# Patient Record
Sex: FEMALE | Race: WHITE | NOT HISPANIC OR LATINO | URBAN - METROPOLITAN AREA
[De-identification: names, ages, dates, MRNs, and addresses within clinical notes are randomized per-mention and may not be internally consistent; named-entity substitution may affect disease eponyms.]

---

## 2019-10-07 ENCOUNTER — EMERGENCY (EMERGENCY)
Facility: HOSPITAL | Age: 85
LOS: 1 days | Discharge: HOME | End: 2019-10-07
Attending: EMERGENCY MEDICINE
Payer: MEDICARE

## 2019-10-07 VITALS
HEART RATE: 75 BPM | DIASTOLIC BLOOD PRESSURE: 70 MMHG | SYSTOLIC BLOOD PRESSURE: 126 MMHG | TEMPERATURE: 98 F | HEIGHT: 59.5 IN | OXYGEN SATURATION: 96 % | WEIGHT: 145.95 LBS | RESPIRATION RATE: 18 BRPM

## 2019-10-07 LAB
ALBUMIN SERPL ELPH-MCNC: 3.9 G/DL — SIGNIFICANT CHANGE UP (ref 3.3–5)
ALP SERPL-CCNC: 53 U/L — SIGNIFICANT CHANGE UP (ref 40–120)
ALT FLD-CCNC: 18 U/L — SIGNIFICANT CHANGE UP (ref 10–45)
ANION GAP SERPL CALC-SCNC: 8 MMOL/L — SIGNIFICANT CHANGE UP (ref 5–17)
AST SERPL-CCNC: 18 U/L — SIGNIFICANT CHANGE UP (ref 10–40)
BILIRUB SERPL-MCNC: 0.5 MG/DL — SIGNIFICANT CHANGE UP (ref 0.2–1.2)
BUN SERPL-MCNC: 28 MG/DL — HIGH (ref 7–23)
CALCIUM SERPL-MCNC: 9.5 MG/DL — SIGNIFICANT CHANGE UP (ref 8.4–10.5)
CHLORIDE SERPL-SCNC: 103 MMOL/L — SIGNIFICANT CHANGE UP (ref 96–108)
CO2 SERPL-SCNC: 26 MMOL/L — SIGNIFICANT CHANGE UP (ref 22–31)
CREAT SERPL-MCNC: 1.18 MG/DL — SIGNIFICANT CHANGE UP (ref 0.5–1.3)
GLUCOSE SERPL-MCNC: 85 MG/DL — SIGNIFICANT CHANGE UP (ref 70–99)
HCT VFR BLD CALC: 41.9 % — SIGNIFICANT CHANGE UP (ref 34.5–45)
HGB BLD-MCNC: 13.8 G/DL — SIGNIFICANT CHANGE UP (ref 11.5–15.5)
MCHC RBC-ENTMCNC: 31.5 PG — SIGNIFICANT CHANGE UP (ref 27–34)
MCHC RBC-ENTMCNC: 32.9 GM/DL — SIGNIFICANT CHANGE UP (ref 32–36)
MCV RBC AUTO: 95.7 FL — SIGNIFICANT CHANGE UP (ref 80–100)
NRBC # BLD: 0 /100 WBCS — SIGNIFICANT CHANGE UP (ref 0–0)
PLATELET # BLD AUTO: 177 K/UL — SIGNIFICANT CHANGE UP (ref 150–400)
POTASSIUM SERPL-MCNC: 4.7 MMOL/L — SIGNIFICANT CHANGE UP (ref 3.5–5.3)
POTASSIUM SERPL-SCNC: 4.7 MMOL/L — SIGNIFICANT CHANGE UP (ref 3.5–5.3)
PROT SERPL-MCNC: 6.4 G/DL — SIGNIFICANT CHANGE UP (ref 6–8.3)
RBC # BLD: 4.38 M/UL — SIGNIFICANT CHANGE UP (ref 3.8–5.2)
RBC # FLD: 13.4 % — SIGNIFICANT CHANGE UP (ref 10.3–14.5)
SODIUM SERPL-SCNC: 137 MMOL/L — SIGNIFICANT CHANGE UP (ref 135–145)
TROPONIN T, HIGH SENSITIVITY RESULT: 6 NG/L — SIGNIFICANT CHANGE UP (ref 0–51)
TROPONIN T, HIGH SENSITIVITY RESULT: 8 NG/L — SIGNIFICANT CHANGE UP (ref 0–51)
WBC # BLD: 10.97 K/UL — HIGH (ref 3.8–10.5)
WBC # FLD AUTO: 10.97 K/UL — HIGH (ref 3.8–10.5)

## 2019-10-07 PROCEDURE — 71046 X-RAY EXAM CHEST 2 VIEWS: CPT

## 2019-10-07 PROCEDURE — 99284 EMERGENCY DEPT VISIT MOD MDM: CPT | Mod: 25

## 2019-10-07 PROCEDURE — 99285 EMERGENCY DEPT VISIT HI MDM: CPT | Mod: GC

## 2019-10-07 PROCEDURE — 93010 ELECTROCARDIOGRAM REPORT: CPT | Mod: GC

## 2019-10-07 PROCEDURE — 85027 COMPLETE CBC AUTOMATED: CPT

## 2019-10-07 PROCEDURE — 93005 ELECTROCARDIOGRAM TRACING: CPT

## 2019-10-07 PROCEDURE — 80053 COMPREHEN METABOLIC PANEL: CPT

## 2019-10-07 PROCEDURE — 84484 ASSAY OF TROPONIN QUANT: CPT

## 2019-10-07 PROCEDURE — 71046 X-RAY EXAM CHEST 2 VIEWS: CPT | Mod: 26

## 2019-10-07 NOTE — ED PROVIDER NOTE - CLINICAL SUMMARY MEDICAL DECISION MAKING FREE TEXT BOX
Gretchen PGY3: 87F hx of HLD presents with a cc of sudden onset non exertional chest pain a/w radiation to b/l shoulders of back, resolved while in ED, no LE swelling, today had smile mild SOB however also resolved.  exam vss non toxic appearing will eval for acs check labs cxr ekg trop, reassess

## 2019-10-07 NOTE — ED PROVIDER NOTE - PATIENT PORTAL LINK FT
You can access the FollowMyHealth Patient Portal offered by [facility] by registering at the following website: [email or web address]. By joining [facility]’s FollowMyHealth portal, you will also be able to view your health information using other applications (apps) compatible with our system.

## 2019-10-07 NOTE — CONSULT NOTE ADULT - ASSESSMENT
87F hx of HLD presenting with chest pain.     1. Chest pain 87F hx of HLD presenting with atypical chest pain.     1. Atypical Chest pain  likely GI related, 2/2 to GERD   cv stable, HST neg x 2, EKG with no evidence of acute ischemia   CP now resolved, no sob, rangel  cxr clear, no evidence of CHF on exam  continue statin for HLD , benicar for HTN  pt. does not take asa as pt. has history of ulcerative colitis   if admitted can check echo to eval LV function, valvular disease - can be done as outpt w/ Dr. Chris   further w/u , dispo per ED     if discharged, outpt. f/u with Dr. Chris within 1 week.

## 2019-10-07 NOTE — ED PROVIDER NOTE - PLAN OF CARE
Thank you for visiting our Emergency Department, it has been a pleasure taking part in your healthcare.    Your discharge diagnosis is: chest pain  Please take all discharge medications as indicated below:  Take Motrin/Tylenol for pain as needed, please follow instructions on manufacturers label. If you have any questions please consult a pharmacist or your PMD.  Please follow up with your PMD within x48 hours.  Please follow up with Cardiology within x48 hours.  A copy of resulted labs, imaging, and findings have been provided to you.   You have had a detailed discussion with your provider regarding your diagnosis, care management and discharge planning including, but not limited to: return precautions, follow up visits with existing or new providers, new prescriptions and/or medication changes, wound and/or splint/cast care or other care   aspects specific to your diagnosis and treatment. You have been given the opportunity to have your questions answered. At this time you have been deemed stable and fit for discharge.  Return precautions to the Emergency Department include but are not limited to: unrelenting nausea, vomiting, fever, chills, chest pain, shortness of breath, dizziness, chest or abdominal pain, worsening back pain, syncope, blood in urine or stool, headache that doesn't resolve, numbness or tingling, loss of sensation, loss of motor function, or any other concerning symptoms.

## 2019-10-07 NOTE — CONSULT NOTE ADULT - SUBJECTIVE AND OBJECTIVE BOX
CARDIOLOGY CONSULT - Dr. Tse     CHIEF COMPLAINT: chest pain     HPI: 87F hx of HLD presents with a cc of sudden onset non exertional chest pain a/w radiation to b/l shoulders of back, resolved while in ED, no LE swelling, today had smile mild SOB however also resolved. No other complaints. Patient denies any current chest pain, dyspnea, palpitations, cough, syncope, edema, exertional symptoms, nausea, abdominal pain, fever, chills,  or rash.  Denies any history of CAD, MI, valve disease, cardiomyopathy, or congenital heart disease.       PAST MEDICAL & SURGICAL HISTORY:  Skin lesion of right arm  Anxiety  Ulcerative colitis  High cholesterol  Hypertension  No significant past surgical history          PREVIOUS DIAGNOSTIC TESTING:    [ ] Echocardiogram: < from: Transthoracic Echocardiogram (02.05.16 @ 16:11) >  Conclusions:  1. Mitral annular calcificationand calcified mitral  leaflets with normal diastolic opening. Minimal mitral  regurgitation.  2. Normal left ventricular systolic function. No segmental  wall motion abnormalities.  3. Mild diastolic dysfunction (Stage I).  4. Right ventricular enlargement with normal right  ventricular systolic function.  *** No previous Echo exam.    < end of copied text >    [ ]  Catheterization:   [ ] Stress Test:  	    MEDICATIONS:  MEDICATIONS  (STANDING):      FAMILY HISTORY:  No pertinent family history in first degree relatives      SOCIAL HISTORY:    [ ] Non-smoker  [ ] Smoker  [ ] Alcohol    Allergies    penicillin (Unknown)    Intolerances    	REVIEW OF SYSTEMS:  CONSTITUTIONAL: No fever, weight loss, or fatigue  EYES: No eye pain, visual disturbances, or discharge  ENMT:  No difficulty hearing, tinnitus, vertigo; No sinus or throat pain  NECK: No pain or stiffness  RESPIRATORY: No cough, wheezing, chills or hemoptysis; No Shortness of Breath  CARDIOVASCULAR: +chest pain, palpitations, passing out, dizziness, or leg swelling  GASTROINTESTINAL: No abdominal or epigastric pain. No nausea, vomiting, or hematemesis; No diarrhea or constipation. No melena or hematochezia.  GENITOURINARY: No dysuria, frequency, hematuria, or incontinence  NEUROLOGICAL: No headaches, memory loss, loss of strength, numbness, or tremors  SKIN: No itching, burning, rashes, or lesions   	    [x ] All others negative	  [ ] Unable to obtain    PHYSICAL EXAM:  T(C): 36.8 (10-07-19 @ 13:38), Max: 36.8 (10-07-19 @ 13:38)  HR: 75 (10-07-19 @ 13:38) (75 - 75)  BP: 126/70 (10-07-19 @ 13:38) (126/70 - 126/70)  RR: 18 (10-07-19 @ 13:38) (18 - 18)  SpO2: 96% (10-07-19 @ 13:38) (96% - 96%)  Wt(kg): --  I&O's Summary      Appearance: Normal	  Psychiatry: A & O x 3, Mood & affect appropriate  HEENT:   Normal oral mucosa, PERRL, EOMI	  Lymphatic: No lymphadenopathy  Cardiovascular: Normal S1 S2,RRR, No JVD, No murmurs  Respiratory: Lungs clear to auscultation	  Gastrointestinal:  Soft, Non-tender, + BS	  Skin: No rashes, No ecchymoses, No cyanosis	  Neurologic: Non-focal  Extremities: Normal range of motion, No clubbing, cyanosis or edema  Vascular: Peripheral pulses palpable 2+ bilaterally    TELEMETRY: 	    ECG:  	NSR 70, no acute ischemic changes   RADIOLOGY: < from: Xray Chest 2 Views PA/Lat (10.07.19 @ 14:41) >  Impression:    The heart is normal in size. The lungs are clear. Calcified aortic knob.   No pleural effusion. No pneumothorax. No acute bony pathology could be   identified on the current study.    < end of copied text >    OTHER: 	  	  LABS:	 	    CARDIAC MARKERS:      ce neg x 1                            13.8   10.97 )-----------( 177      ( 07 Oct 2019 14:21 )             41.9     10-07    137  |  103  |  28<H>  ----------------------------<  85  4.7   |  26  |  1.18    Ca    9.5      07 Oct 2019 14:21    TPro  6.4  /  Alb  3.9  /  TBili  0.5  /  DBili  x   /  AST  18  /  ALT  18  /  AlkPhos  53  10-07      proBNP:   Lipid Profile:   HgA1c:   TSH: CARDIOLOGY CONSULT - Dr. Tse     CHIEF COMPLAINT: chest pain     HPI: 87F hx of HLD presents with a cc of sudden onset non exertional chest pain a/w radiation to b/l shoulders of back. As per patient the pain staarted before breakfast, she thought it was her usual indigestion pain but when it didnt go away right away she got nervous, discomfort was resolved while in ED. No other complaints. Patient denies any current chest pain, dyspnea, palpitations, cough, syncope, edema, exertional symptoms, nausea, abdominal pain, fever, chills,  or rash.  Denies any history of CAD, MI, valve disease, cardiomyopathy, or congenital heart disease.      PAST MEDICAL & SURGICAL HISTORY:  Skin lesion of right arm  Anxiety  Ulcerative colitis  High cholesterol  Hypertension  No significant past surgical history          PREVIOUS DIAGNOSTIC TESTING:    [ ] Echocardiogram: < from: Transthoracic Echocardiogram (02.05.16 @ 16:11) >  Conclusions:  1. Mitral annular calcificationand calcified mitral  leaflets with normal diastolic opening. Minimal mitral  regurgitation.  2. Normal left ventricular systolic function. No segmental  wall motion abnormalities.  3. Mild diastolic dysfunction (Stage I).  4. Right ventricular enlargement with normal right  ventricular systolic function.  *** No previous Echo exam.    < end of copied text >    [ ]  Catheterization:   [ ] Stress Test:  	    MEDICATIONS:  MEDICATIONS  (STANDING):      FAMILY HISTORY:  No pertinent family history in first degree relatives      SOCIAL HISTORY:    [ ] Non-smoker  [ ] Smoker  [ ] Alcohol    Allergies    penicillin (Unknown)    Intolerances    	REVIEW OF SYSTEMS:  CONSTITUTIONAL: No fever, weight loss, or fatigue  EYES: No eye pain, visual disturbances, or discharge  ENMT:  No difficulty hearing, tinnitus, vertigo; No sinus or throat pain  NECK: No pain or stiffness  RESPIRATORY: No cough, wheezing, chills or hemoptysis; No Shortness of Breath  CARDIOVASCULAR: +chest pain, palpitations, passing out, dizziness, or leg swelling  GASTROINTESTINAL: No abdominal or epigastric pain. No nausea, vomiting, or hematemesis; No diarrhea or constipation. No melena or hematochezia.  GENITOURINARY: No dysuria, frequency, hematuria, or incontinence  NEUROLOGICAL: No headaches, memory loss, loss of strength, numbness, or tremors  SKIN: No itching, burning, rashes, or lesions   	    [x ] All others negative	  [ ] Unable to obtain    PHYSICAL EXAM:  T(C): 36.8 (10-07-19 @ 13:38), Max: 36.8 (10-07-19 @ 13:38)  HR: 75 (10-07-19 @ 13:38) (75 - 75)  BP: 126/70 (10-07-19 @ 13:38) (126/70 - 126/70)  RR: 18 (10-07-19 @ 13:38) (18 - 18)  SpO2: 96% (10-07-19 @ 13:38) (96% - 96%)  Wt(kg): --  I&O's Summary      Appearance: Normal	  Psychiatry: A & O x 3, Mood & affect appropriate  HEENT:   Normal oral mucosa, PERRL, EOMI	  Lymphatic: No lymphadenopathy  Cardiovascular: Normal S1 S2,RRR, No JVD, No murmurs  Respiratory: Lungs clear to auscultation	  Gastrointestinal:  Soft, Non-tender, + BS	  Skin: No rashes, No ecchymoses, No cyanosis	  Neurologic: Non-focal  Extremities: Normal range of motion, No clubbing, cyanosis or edema  Vascular: Peripheral pulses palpable 2+ bilaterally    TELEMETRY: 	    ECG:  	NSR 70, no acute ischemic changes   RADIOLOGY: < from: Xray Chest 2 Views PA/Lat (10.07.19 @ 14:41) >  Impression:    The heart is normal in size. The lungs are clear. Calcified aortic knob.   No pleural effusion. No pneumothorax. No acute bony pathology could be   identified on the current study.    < end of copied text >    OTHER: 	  	  LABS:	 	    CARDIAC MARKERS:      ce neg x 1                            13.8   10.97 )-----------( 177      ( 07 Oct 2019 14:21 )             41.9     10-07    137  |  103  |  28<H>  ----------------------------<  85  4.7   |  26  |  1.18    Ca    9.5      07 Oct 2019 14:21    TPro  6.4  /  Alb  3.9  /  TBili  0.5  /  DBili  x   /  AST  18  /  ALT  18  /  AlkPhos  53  10-07      proBNP:   Lipid Profile:   HgA1c:   TSH:

## 2019-10-07 NOTE — CONSULT NOTE ADULT - ATTENDING COMMENTS
Agree with above NP note.  cv stable   atypical chest pain  no acs, hs trop neg x 2  stable for d/c and f/u with dr cruz this week

## 2019-10-07 NOTE — ED PROVIDER NOTE - ATTENDING CONTRIBUTION TO CARE
Patient presenting complaining of chest pains.  Reporting felt "gas" like, central.  Began suddenly earlier this morning, self resolved prior to coming to Emergency Department.  No associated dyspnea, nausea, diaphoresis.  No known history of CAD.  Prior echos/stress testing with PMD/cardiologist (JAXON Chris) have been negative.  No worsening or changes with exertion. Now feels back to baseline.    A 14 point review of systems is negative except as in HPI or otherwise documented.    Exam:  General: Patient well appearing, vital signs within normal limits  HEENT: airway patent with moist mucous membranes  Cardiac: RRR S1/S2 with strong peripheral pulses  Respiratory: lungs clear without respiratory distress  GI: abdomen soft, non tender, non distended  Neuro: no gross neurologic deficits  Skin: warm, well perfused  Psych: normal mood and affect    Patient presenting with atypical resolved, non exertional chest pains.  No known history of CAD.  EKG unremarkable.  Labs, CXR and serial troponins to rule out ACS were negative.  Discussed case with Dr Chris who will be able to see patient in his office tomorrow for follow up.  Patient requesting discharge.

## 2019-10-07 NOTE — ED PROVIDER NOTE - OBJECTIVE STATEMENT
87F hx of HLD presents with a cc of sudden onset non exertional chest pain a/w radiation to b/l shoulders of back, resolved while in ED, no LE swelling, today had smile mild SOB however also resolved. No other complaints. Prior to today, previous cp, sob. Denies n/v/f/c/cp/sob. Denies headache, syncope, lightheadedness, dizziness. Denies chest palpitations, abdominal pain. Denies dysuria, hematuria, hematochezia, BRBPR, tarry stools, diarrhea, constipation.

## 2019-10-07 NOTE — ED ADULT NURSE NOTE - OBJECTIVE STATEMENT
Patient  is  alert  and  oriented  x3.  Color is  good  and  skin  warm  to touch.  She  is  c/o  chest  pain  with  radiation  to  back.  No  SOB  or  diaphoresis   noted.

## 2019-10-07 NOTE — ED PROVIDER NOTE - PROGRESS NOTE DETAILS
Gretchen PGY3: Pt assessed at beside. Pt resting comfortably, pain controlled, pt questions answered. Vital signs stable. Feels well, asking to be dc'd, no cp while in ED< pt to follow up w Dr. Doss tomorrow, understands plan will seek folllow up. Will d/c with PMD f/u, strict return precautions given with read back per pt/family/caregiver.

## 2019-10-07 NOTE — ED PROVIDER NOTE - CARE PLAN
Principal Discharge DX:	Chest pain  Assessment and plan of treatment:	Thank you for visiting our Emergency Department, it has been a pleasure taking part in your healthcare.    Your discharge diagnosis is: chest pain  Please take all discharge medications as indicated below:  Take Motrin/Tylenol for pain as needed, please follow instructions on manufacturers label. If you have any questions please consult a pharmacist or your PMD.  Please follow up with your PMD within x48 hours.  Please follow up with Cardiology within x48 hours.  A copy of resulted labs, imaging, and findings have been provided to you.   You have had a detailed discussion with your provider regarding your diagnosis, care management and discharge planning including, but not limited to: return precautions, follow up visits with existing or new providers, new prescriptions and/or medication changes, wound and/or splint/cast care or other care   aspects specific to your diagnosis and treatment. You have been given the opportunity to have your questions answered. At this time you have been deemed stable and fit for discharge.  Return precautions to the Emergency Department include but are not limited to: unrelenting nausea, vomiting, fever, chills, chest pain, shortness of breath, dizziness, chest or abdominal pain, worsening back pain, syncope, blood in urine or stool, headache that doesn't resolve, numbness or tingling, loss of sensation, loss of motor function, or any other concerning symptoms.

## 2022-12-24 NOTE — ED PROVIDER NOTE - INTERPRETATION
normal sinus rhythm, Normal axis, Normal OR interval and QRS complex. There are no acute ischemic ST or T-wave changes.
1 pair

## 2023-02-21 ENCOUNTER — APPOINTMENT (OUTPATIENT)
Dept: VASCULAR SURGERY | Facility: CLINIC | Age: 89
End: 2023-02-21
Payer: MEDICARE

## 2023-02-21 VITALS — HEIGHT: 59 IN | BODY MASS INDEX: 27.82 KG/M2 | TEMPERATURE: 97.8 F | WEIGHT: 138 LBS

## 2023-02-21 PROCEDURE — 93970 EXTREMITY STUDY: CPT

## 2023-02-21 PROCEDURE — 99204 OFFICE O/P NEW MOD 45 MIN: CPT

## 2023-02-23 NOTE — CONSULT LETTER
[Dear  ___] : Dear  [unfilled], [Consult Letter:] : I had the pleasure of evaluating your patient, [unfilled]. [Please see my note below.] : Please see my note below. [Consult Closing:] : Thank you very much for allowing me to participate in the care of this patient.  If you have any questions, please do not hesitate to contact me. [Sincerely,] : Sincerely, [FreeTextEntry3] : Andres Kaplan M.D., NIRAJ., R.P.V.I.\par \par  [facility]\par  of  Surgery\par Assistant Professor of Radiology\par Vascular Surgery at [place]

## 2023-02-23 NOTE — ASSESSMENT
[FreeTextEntry1] : In the office today patient underwent a duplex study which shows no evidence of DVT.  There is minimal insufficiency bilaterally.  At this time there is no serious cause of her lower extremity swelling.  Valsartan could be contributing to her mild swelling.  This she should discuss with her PMD.  Patient may follow-up as needed

## 2023-02-23 NOTE — PHYSICAL EXAM
[JVD] : no jugular venous distention  [Normal Breath Sounds] : Normal breath sounds [Normal Rate and Rhythm] : normal rate and rhythm [2+] : left 2+ [Ankle Swelling (On Exam)] : present [Ankle Swelling Bilaterally] : bilaterally  [Ankle Swelling On The Right] : mild [Varicose Veins Of Lower Extremities] : not present [] : not present [Abdomen Tenderness] : ~T ~M No abdominal tenderness [No Rash or Lesion] : No rash or lesion [Alert] : alert [Calm] : calm [de-identified] : Appears well

## 2023-02-23 NOTE — HISTORY OF PRESENT ILLNESS
[FreeTextEntry1] : 89-year-old female with no significant medical history presents to the office complaining of swelling in both lower extremities.  Patient states that the swelling is markedly improved in the morning.  As the day progresses the swelling worsens.  Patient does not have a history of DVT in the past.  She is able to ambulate without difficulty.  She presents for evaluation.

## 2024-02-21 ENCOUNTER — APPOINTMENT (OUTPATIENT)
Dept: PODIATRY | Facility: CLINIC | Age: 89
End: 2024-02-21
Payer: MEDICARE

## 2024-02-21 DIAGNOSIS — M20.42 OTHER HAMMER TOE(S) (ACQUIRED), LEFT FOOT: ICD-10-CM

## 2024-02-21 PROCEDURE — 99203 OFFICE O/P NEW LOW 30 MIN: CPT

## 2024-02-21 PROCEDURE — 73630 X-RAY EXAM OF FOOT: CPT | Mod: LT

## 2024-02-23 DIAGNOSIS — Z86.79 PERSONAL HISTORY OF OTHER DISEASES OF THE CIRCULATORY SYSTEM: ICD-10-CM

## 2024-02-23 DIAGNOSIS — Z86.39 PERSONAL HISTORY OF OTHER ENDOCRINE, NUTRITIONAL AND METABOLIC DISEASE: ICD-10-CM

## 2024-02-23 DIAGNOSIS — Z82.49 FAMILY HISTORY OF ISCHEMIC HEART DISEASE AND OTHER DISEASES OF THE CIRCULATORY SYSTEM: ICD-10-CM

## 2024-02-23 DIAGNOSIS — Z87.891 PERSONAL HISTORY OF NICOTINE DEPENDENCE: ICD-10-CM

## 2024-02-23 DIAGNOSIS — Z80.6 FAMILY HISTORY OF LEUKEMIA: ICD-10-CM

## 2024-02-23 RX ORDER — VALSARTAN 40 MG/1
40 TABLET, COATED ORAL
Refills: 0 | Status: ACTIVE | COMMUNITY

## 2024-02-23 RX ORDER — MESALAMINE 1.2 G/1
TABLET, DELAYED RELEASE ORAL
Refills: 0 | Status: ACTIVE | COMMUNITY

## 2024-02-23 RX ORDER — SIMVASTATIN 40 MG/1
40 TABLET, FILM COATED ORAL
Refills: 0 | Status: ACTIVE | COMMUNITY

## 2024-02-23 NOTE — ASSESSMENT
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  Treatment: Discussed an arthroplasty of the left 4th toe for permanent correction.  Explained to the patient that I would send her for a vascular workup to be sure that she has enough circulation to heal.  Explained the procedure in detail to the patient as well as the risks, complications and alternatives.   In the interim, she was given padding.  She is going to [place], I do feel that she can go.  Will reevaluate when she returns.  Any questions or problems, patient is to contact the office.

## 2024-02-23 NOTE — PROCEDURE
[FreeTextEntry1] : X-rays were taken to evaluate the foot. (3 views - left foot) There is contracted digits.  No evidence of any osteomyelitis.

## 2024-02-23 NOTE — CONSULT LETTER
[Dear  ___] : Dear  [unfilled], [Consult Letter:] : I had the pleasure of evaluating your patient, [unfilled]. [Please see my note below.] : Please see my note below. [Consult Closing:] : Thank you very much for allowing me to participate in the care of this patient.  If you have any questions, please do not hesitate to contact me. [Sincerely,] : Sincerely, [FreeTextEntry2] : Demetrio Merchant, TWIN [address] [facility] [address] [place] [address]  [FreeTextEntry3] : Charles M. Lombardi, DPM, FACFAS Systems Chief, Podiatric Services [facility] Assistant Professor of Surgery [facility] [employer] [employer] at [facility]

## 2024-02-23 NOTE — PHYSICAL EXAM
[General Appearance - Alert] : alert [2+] : left foot dorsalis pedis 2+ [Oriented To Time, Place, And Person] : oriented to person, place, and time [Varicose Veins Of Lower Extremities] : not present [] : not present [Delayed in the Right Toes] : capillary refills normal in right toes [Delayed in the Left Toes] : capillary refills normal in the left toes [FreeTextEntry3] : There is swelling about the ankle making it difficult to determine the PT pulse.   [de-identified] : Forefoot varus, fully compensated.  Mild contracture of the digits 2 through 5.  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  Rigid contracture of the left 4th toe with a large dorsal lesion.  Upon debridement of the lesion, there is noted to be bleeding within the center.   She gets bursitis over the area and continues to have pain.   [Foot Ulcer] : no foot ulcer [Skin Induration] : no skin induration

## 2024-02-23 NOTE — HISTORY OF PRESENT ILLNESS
[FreeTextEntry1] : Patient presents today with a painful left 4th hammertoe.  She has been treating it, debriding it and it is preulcerous but she has continued pain.  Patient has difficulty ambulating due to pain and discomfort.  Patient is wearing a surgical shoe.   Patient recently saw Dr. Kaplan to rule out a DVT and there was no evidence of a DVT.

## 2025-03-28 PROBLEM — R91.8 LUNG MASS: Status: ACTIVE | Noted: 2025-03-28

## 2025-03-28 PROBLEM — J90 PLEURAL EFFUSION: Status: ACTIVE | Noted: 2025-03-28

## 2025-03-31 ENCOUNTER — APPOINTMENT (OUTPATIENT)
Dept: THORACIC SURGERY | Facility: CLINIC | Age: 89
End: 2025-03-31
Payer: MEDICARE

## 2025-03-31 VITALS
DIASTOLIC BLOOD PRESSURE: 82 MMHG | RESPIRATION RATE: 16 BRPM | SYSTOLIC BLOOD PRESSURE: 153 MMHG | WEIGHT: 130 LBS | BODY MASS INDEX: 26.21 KG/M2 | OXYGEN SATURATION: 96 % | HEART RATE: 84 BPM | HEIGHT: 59 IN

## 2025-03-31 DIAGNOSIS — R91.8 OTHER NONSPECIFIC ABNORMAL FINDING OF LUNG FIELD: ICD-10-CM

## 2025-03-31 LAB
ALBUMIN SERPL ELPH-MCNC: 4.3 G/DL
ALP BLD-CCNC: 76 U/L
ALT SERPL-CCNC: 7 U/L
ANION GAP SERPL CALC-SCNC: 11 MMOL/L
APTT BLD: 36.7 SEC
AST SERPL-CCNC: 15 U/L
BASOPHILS # BLD AUTO: 0.01 K/UL
BASOPHILS NFR BLD AUTO: 0.2 %
BILIRUB SERPL-MCNC: 0.2 MG/DL
BUN SERPL-MCNC: 26 MG/DL
CALCIUM SERPL-MCNC: 9.8 MG/DL
CHLORIDE SERPL-SCNC: 106 MMOL/L
CO2 SERPL-SCNC: 24 MMOL/L
CREAT SERPL-MCNC: 1.32 MG/DL
EGFRCR SERPLBLD CKD-EPI 2021: 38 ML/MIN/1.73M2
EOSINOPHIL # BLD AUTO: 0.02 K/UL
EOSINOPHIL NFR BLD AUTO: 0.3 %
GLUCOSE SERPL-MCNC: 80 MG/DL
HCT VFR BLD CALC: 36.4 %
HGB BLD-MCNC: 11.8 G/DL
IMM GRANULOCYTES NFR BLD AUTO: 0.2 %
INR PPP: 0.97 RATIO
LYMPHOCYTES # BLD AUTO: 1.07 K/UL
LYMPHOCYTES NFR BLD AUTO: 18.5 %
MAN DIFF?: NORMAL
MCHC RBC-ENTMCNC: 31.1 PG
MCHC RBC-ENTMCNC: 32.4 G/DL
MCV RBC AUTO: 95.8 FL
MONOCYTES # BLD AUTO: 0.43 K/UL
MONOCYTES NFR BLD AUTO: 7.4 %
NEUTROPHILS # BLD AUTO: 4.25 K/UL
NEUTROPHILS NFR BLD AUTO: 73.4 %
PLATELET # BLD AUTO: 197 K/UL
POTASSIUM SERPL-SCNC: 4.7 MMOL/L
PROT SERPL-MCNC: 6.9 G/DL
PT BLD: 11.4 SEC
RBC # BLD: 3.8 M/UL
RBC # FLD: 13.9 %
SODIUM SERPL-SCNC: 141 MMOL/L
WBC # FLD AUTO: 5.79 K/UL

## 2025-03-31 PROCEDURE — 99205 OFFICE O/P NEW HI 60 MIN: CPT

## 2025-03-31 RX ORDER — LEVOFLOXACIN 750 MG/1
TABLET, FILM COATED ORAL
Refills: 0 | Status: ACTIVE | COMMUNITY

## 2025-04-03 ENCOUNTER — APPOINTMENT (OUTPATIENT)
Dept: RADIOLOGY | Facility: IMAGING CENTER | Age: 89
End: 2025-04-03
Payer: MEDICARE

## 2025-04-03 ENCOUNTER — RESULT REVIEW (OUTPATIENT)
Age: 89
End: 2025-04-03

## 2025-04-03 ENCOUNTER — APPOINTMENT (OUTPATIENT)
Dept: ULTRASOUND IMAGING | Facility: IMAGING CENTER | Age: 89
End: 2025-04-03
Payer: MEDICARE

## 2025-04-03 ENCOUNTER — OUTPATIENT (OUTPATIENT)
Dept: OUTPATIENT SERVICES | Facility: HOSPITAL | Age: 89
LOS: 1 days | End: 2025-04-03
Payer: MEDICARE

## 2025-04-03 DIAGNOSIS — J90 PLEURAL EFFUSION, NOT ELSEWHERE CLASSIFIED: ICD-10-CM

## 2025-04-03 PROCEDURE — 32555 ASPIRATE PLEURA W/ IMAGING: CPT

## 2025-04-03 PROCEDURE — 71046 X-RAY EXAM CHEST 2 VIEWS: CPT | Mod: 26,XE

## 2025-04-03 PROCEDURE — 32555 ASPIRATE PLEURA W/ IMAGING: CPT | Mod: LT

## 2025-04-03 PROCEDURE — 88112 CYTOPATH CELL ENHANCE TECH: CPT | Mod: 26

## 2025-04-03 PROCEDURE — 88305 TISSUE EXAM BY PATHOLOGIST: CPT | Mod: 26

## 2025-04-03 PROCEDURE — 71046 X-RAY EXAM CHEST 2 VIEWS: CPT

## 2025-04-03 PROCEDURE — 88305 TISSUE EXAM BY PATHOLOGIST: CPT

## 2025-04-03 PROCEDURE — 88112 CYTOPATH CELL ENHANCE TECH: CPT

## 2025-04-04 ENCOUNTER — INPATIENT (INPATIENT)
Facility: HOSPITAL | Age: 89
LOS: 1 days | Discharge: HOME | End: 2025-04-06
Attending: THORACIC SURGERY (CARDIOTHORACIC VASCULAR SURGERY) | Admitting: THORACIC SURGERY (CARDIOTHORACIC VASCULAR SURGERY)
Payer: MEDICARE

## 2025-04-04 VITALS
DIASTOLIC BLOOD PRESSURE: 54 MMHG | HEART RATE: 90 BPM | SYSTOLIC BLOOD PRESSURE: 148 MMHG | TEMPERATURE: 99 F | OXYGEN SATURATION: 97 % | RESPIRATION RATE: 18 BRPM

## 2025-04-04 DIAGNOSIS — J90 PLEURAL EFFUSION, NOT ELSEWHERE CLASSIFIED: ICD-10-CM

## 2025-04-04 DIAGNOSIS — Z85.828 PERSONAL HISTORY OF OTHER MALIGNANT NEOPLASM OF SKIN: Chronic | ICD-10-CM

## 2025-04-04 DIAGNOSIS — J90 PLEURAL EFFUSION, NOT ELSEWHERE CLASSIFIED: Chronic | ICD-10-CM

## 2025-04-04 LAB
ANION GAP SERPL CALC-SCNC: 13 MMOL/L — SIGNIFICANT CHANGE UP (ref 7–14)
BLD GP AB SCN SERPL QL: NEGATIVE — SIGNIFICANT CHANGE UP
BUN SERPL-MCNC: 31 MG/DL — HIGH (ref 7–23)
CALCIUM SERPL-MCNC: 9.7 MG/DL — SIGNIFICANT CHANGE UP (ref 8.4–10.5)
CHLORIDE SERPL-SCNC: 105 MMOL/L — SIGNIFICANT CHANGE UP (ref 98–107)
CO2 SERPL-SCNC: 20 MMOL/L — LOW (ref 22–31)
CREAT SERPL-MCNC: 1.39 MG/DL — HIGH (ref 0.5–1.3)
EGFR: 36 ML/MIN/1.73M2 — LOW
EGFR: 36 ML/MIN/1.73M2 — LOW
GLUCOSE SERPL-MCNC: 79 MG/DL — SIGNIFICANT CHANGE UP (ref 70–99)
HCT VFR BLD CALC: 34 % — LOW (ref 34.5–45)
HGB BLD-MCNC: 11.2 G/DL — LOW (ref 11.5–15.5)
MCHC RBC-ENTMCNC: 31.2 PG — SIGNIFICANT CHANGE UP (ref 27–34)
MCHC RBC-ENTMCNC: 32.9 G/DL — SIGNIFICANT CHANGE UP (ref 32–36)
MCV RBC AUTO: 94.7 FL — SIGNIFICANT CHANGE UP (ref 80–100)
NRBC # BLD AUTO: 0 K/UL — SIGNIFICANT CHANGE UP (ref 0–0)
NRBC # FLD: 0 K/UL — SIGNIFICANT CHANGE UP (ref 0–0)
NRBC BLD AUTO-RTO: 0 /100 WBCS — SIGNIFICANT CHANGE UP (ref 0–0)
PLATELET # BLD AUTO: 197 K/UL — SIGNIFICANT CHANGE UP (ref 150–400)
POTASSIUM SERPL-MCNC: 4.2 MMOL/L — SIGNIFICANT CHANGE UP (ref 3.5–5.3)
POTASSIUM SERPL-SCNC: 4.2 MMOL/L — SIGNIFICANT CHANGE UP (ref 3.5–5.3)
RBC # BLD: 3.59 M/UL — LOW (ref 3.8–5.2)
RBC # FLD: 13.6 % — SIGNIFICANT CHANGE UP (ref 10.3–14.5)
RH IG SCN BLD-IMP: POSITIVE — SIGNIFICANT CHANGE UP
SODIUM SERPL-SCNC: 138 MMOL/L — SIGNIFICANT CHANGE UP (ref 135–145)
WBC # BLD: 5.54 K/UL — SIGNIFICANT CHANGE UP (ref 3.8–10.5)
WBC # FLD AUTO: 5.54 K/UL — SIGNIFICANT CHANGE UP (ref 3.8–10.5)

## 2025-04-04 PROCEDURE — G0545: CPT

## 2025-04-04 PROCEDURE — 99223 1ST HOSP IP/OBS HIGH 75: CPT

## 2025-04-04 PROCEDURE — 99222 1ST HOSP IP/OBS MODERATE 55: CPT

## 2025-04-04 RX ORDER — ONDANSETRON HCL/PF 4 MG/2 ML
4 VIAL (ML) INJECTION ONCE
Refills: 0 | Status: COMPLETED | OUTPATIENT
Start: 2025-04-04 | End: 2025-04-05

## 2025-04-04 RX ORDER — HEPARIN SODIUM 1000 [USP'U]/ML
5000 INJECTION INTRAVENOUS; SUBCUTANEOUS EVERY 12 HOURS
Refills: 0 | Status: DISCONTINUED | OUTPATIENT
Start: 2025-04-04 | End: 2025-04-06

## 2025-04-04 RX ORDER — ACETAMINOPHEN 500 MG/5ML
650 LIQUID (ML) ORAL EVERY 6 HOURS
Refills: 0 | Status: DISCONTINUED | OUTPATIENT
Start: 2025-04-04 | End: 2025-04-06

## 2025-04-04 RX ORDER — ATORVASTATIN CALCIUM 80 MG/1
20 TABLET, FILM COATED ORAL AT BEDTIME
Refills: 0 | Status: DISCONTINUED | OUTPATIENT
Start: 2025-04-04 | End: 2025-04-04

## 2025-04-04 RX ORDER — CEFTRIAXONE 500 MG/1
2000 INJECTION, POWDER, FOR SOLUTION INTRAMUSCULAR; INTRAVENOUS EVERY 24 HOURS
Refills: 0 | Status: DISCONTINUED | OUTPATIENT
Start: 2025-04-04 | End: 2025-04-06

## 2025-04-04 RX ORDER — METRONIDAZOLE 250 MG
500 TABLET ORAL EVERY 12 HOURS
Refills: 0 | Status: DISCONTINUED | OUTPATIENT
Start: 2025-04-04 | End: 2025-04-06

## 2025-04-04 RX ADMIN — CEFTRIAXONE 100 MILLIGRAM(S): 500 INJECTION, POWDER, FOR SOLUTION INTRAMUSCULAR; INTRAVENOUS at 18:18

## 2025-04-04 RX ADMIN — Medication 40 MILLIGRAM(S): at 23:12

## 2025-04-04 RX ADMIN — Medication 100 MILLIGRAM(S): at 19:04

## 2025-04-04 RX ADMIN — HEPARIN SODIUM 5000 UNIT(S): 1000 INJECTION INTRAVENOUS; SUBCUTANEOUS at 19:05

## 2025-04-04 NOTE — CONSULT NOTE ADULT - ASSESSMENT
89 year old with a history of skin cancer and UC (not on immunosuppressive treatment) presents with a loculated left sided pleural effsion.   She had a thoracentesis with gram negative rods on gram stain.     -Follow up culture results and cytopathology from thoracentesis done on 4/3  (test results from this procedure are in HIE)    -check blood cultures times two     -Follow up chest CT    -PCN allergic     89 year old with a history of skin cancer and UC (not on immunosuppressive treatment) presents with a loculated left sided pleural effusion.  She had a thoracentesis with gram negative rods on gram stain.     Concern for parapneumonic effusion/ empyema.     -Follow up culture results and cytopathology from thoracentesis done on 4/3  (test results from this procedure are in HIE)    -check blood cultures times two     -Follow up chest CT    -Check QuantiFeron    -PCN allergic- mild reaction/ decades ago    -Start Ceftriaxone 2 gram iv daily with metronidazole 500 mg iv q 12    Case and plan discussed with thoracic team

## 2025-04-04 NOTE — H&P ADULT - NSHPREVIEWOFSYSTEMS_GEN_ALL_CORE
See HPI·     Negative General Symptoms	no fever; no chills; no sweating; no anorexia, lethargic  · Skin/Breast	negative  · Ophthalmologic	negative  · ENMT	negative  · Negative Respiratory and Thorax Symptoms	no dyspnea; no cough; no hemoptysis  · Negative Cardiovascular Symptoms	no palpitations; no dyspnea on exertion  · Cardiovascular Symptoms	negative  · Gastrointestinal Comments	negative   negative  · Musculoskeletal	negative  · Neurological Symptoms	negative  · Neurological Comments	tiredness, not feeling well  · Psychiatric	negative  · Hematology/Lymphatics	negative  · Endocrine	negative  · Allergic/Immunologic	negative

## 2025-04-04 NOTE — PATIENT PROFILE ADULT - NSPROGENSOURCEINFO_GEN_A_NUR
Occupational Therapy: Orders received. Chart reviewed and discussed with care team.? Occupational Therapy not indicated due to pt near ADL baseline, demonstrating good understanding of abdominal precautions, will have assist from wife for heavier IADLs. PT following.? Defer discharge recommendations to PT.? Will complete orders.      patient

## 2025-04-04 NOTE — H&P ADULT - NSHPPHYSICALEXAM_GEN_ALL_CORE
PHYSICAL EXAM:  Gen: NAD  Resp: Respirations unlabored. Bilateral chest rise. Decreased breath sounds o n left  Card: RRR.   GI: Soft. Nontender. Nondistended.   Skin: Warm, well perfused, no masses or organomegaly  EXT: No clubbing, cyanosis, or edema

## 2025-04-04 NOTE — H&P ADULT - PROBLEM SELECTOR PLAN 1
Admit to 8T  ID consult  Will start ceftriaxone and flagyl  continue home meds  will obtain CT chest non contrast  above as per Dr No

## 2025-04-04 NOTE — H&P ADULT - HISTORY OF PRESENT ILLNESS
Patient is a 89 y/o F former smoker with PMHx significant for HTN, HLD, , UC on Mesalamine stopped a week ago, TIA, Skin   cancer s/p Mohs surgery x2 who is being admitted tot he thoracic surgery service for IV antibiotics. Patient had an incidental finding of pleural effusion on CT A/P 3/7/2025 done during a w/u for abdominal pain. Patient had follow-up formal imaging shown below:    CT chest on 03/14/2025:   - Trace ground-glass densities of right upper lobe.  - Mild ground-glass densities of left upper lobe.  - Mild relative volume loss of left lower lobe.  - Multifocal pleural loculated fluid as follows:  - Left mid hemithorax, major fissure, 4.0 x 2.0 cm  - Left lower hemithorax, posterolaterally, 9.0 x 4.0 cm  Multifocal pleural irregular thickening as follows:  - Left mid hemithorax, medially, moderate  - Left lower hemithorax, medially, mild to moderate  - Left lower hemithorax, posteriorly, mild  - Left lower hemithorax, laterally, mild    PET/CT on 03/25/2025:   - Loculated small left pleural effusion with patchy pleural thickenings (SUV 4.5), medial left lower lobe opacity/mass 3.2 x 1.8 cm, avid   SUV 12.7. These are slightly smaller since the chest CT. The findings are suspicious for pneumonia with parapneumonic effusion,   versus medial left lower lobe lung malignancy with pleural involvement.  - There is no FDG avid hilar or mediastinal lymphadenopathy.  - Small groundglass densities in the left and right upper lobes are nonavid. There is no FDG avid lesion in the right lung. There is no   pleural effusion on the right.  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    Patient evaluated by Dr. No in office 3/31/2025 and she was sent for IR guided drainage of pleural fluid. Sent sent to IR Dr. Louise for a diagnostic US guided left thoracentesis on 4/3/2025. Pleural fluid positive for PMNs and Gram negative rods.    On exam today, patient appears well. Patient has no complaints at this time. Per pt daughter who is at the bedside, patient has "not been herself" since early March. Patient states prior to drainage of fluid, she would occasionally have pain with deep breathing. Denies fevers, chills, body aces, CP, SOB, N/V/D.

## 2025-04-04 NOTE — H&P ADULT - NSICDXPASTMEDICALHX_GEN_ALL_CORE_FT
PAST MEDICAL HISTORY:  Anxiety     High cholesterol     Hypertension     Pleural effusion     Skin lesion of right arm     Ulcerative colitis

## 2025-04-04 NOTE — CONSULT NOTE ADULT - SUBJECTIVE AND OBJECTIVE BOX
HPI:  89 year old female with past medical history:  ·	UC on Mesalamine (Stopped a week ago)  ·	HTN  ·	HLD  ·	TIA  ·	Skin Ca (Unknown types) s/p Mohs surgery x2,      She was found to have pleural effusion incidentally on CT A/P on 03/07/2025 during work up for abdominal pain.    Subsequent CT chest and PET scan showed a loculated effusion on the right side  PET also showed uptake at colon hepatic  flexure and cecum.    S/p left sided thoracentesis on 4/3  Polymorphonuclear cells and gram negative demetrius were noted on gram stain.  Culture is testing    Patient is pcn allergic        PAST MEDICAL & SURGICAL HISTORY:  Hypertension  High cholesterol  Ulcerative colitis  Anxiety  Skin lesion of right arm  No significant past surgical history          Allergies    penicillin (Unknown)    Intolerances        ANTIMICROBIALS:      OTHER MEDS:  acetaminophen     Tablet .. 650 milliGRAM(s) Oral every 6 hours PRN  heparin   Injectable 5000 Unit(s) SubCutaneous every 12 hours      SOCIAL HISTORY:  former smoker    FAMILY HISTORY:  No pertinent family history in first degree relatives        REVIEW OF SYSTEMS  [  ] ROS unobtainable because:    [  ] All other systems negative except as noted below:	    Constitutional:  [ ] fever [ ] chills  [ ] weight loss  [ ] weakness  Skin:  [ ] rash [ ] phlebitis	  Eyes: [ ] icterus [ ] pain  [ ] discharge	  ENMT: [ ] sore throat  [ ] thrush [ ] ulcers [ ] exudates  Respiratory: [ ] dyspnea [ ] hemoptysis [ ] cough [ ] sputum	  Cardiovascular:  [ ] chest pain [ ] palpitations [ ] edema	  Gastrointestinal:  [ ] nausea [ ] vomiting [ ] diarrhea [ ] constipation [ ] pain	  Genitourinary:  [ ] dysuria [ ] frequency [ ] hematuria [ ] discharge [ ] flank pain  [ ] incontinence  Musculoskeletal:  [ ] myalgias [ ] arthralgias [ ] arthritis  [ ] back pain  Neurological:  [ ] headache [ ] seizures  [ ] confusion/altered mental status  Psychiatric:  [ ] anxiety [ ] depression	  Hematology/Lymphatics:  [ ] lymphadenopathy  Endocrine:  [ ] adrenal [ ] thyroid  Allergic/Immunologic:	 [ ] transplant [ ] seasonal    PHYSICAL EXAM:  General: [ ] non-toxic  HEAD/EYES: [ ] PERRL [ ] white sclera [ ] icterus  ENT:  [ ] normal [ ] supple [ ] thrush [ ] pharyngeal exudate  Cardiovascular:   [ ] murmur [ ] normal [ ] PPM/AICD  Respiratory:  [ ] clear to ausculation bilaterally  GI:  [ ] soft, non-tender, normal bowel sounds  :  [ ] henriquez [ ] no CVA tenderness   Musculoskeletal:  [ ] no synovitis  Neurologic:  [ ] non-focal exam   Skin:  [ ] no rash  Lymph: [ ] no lymphadenopathy  Psychiatric:  [ ] appropriate affect [ ] alert & oriented  Lines:  [ ] no phlebitis [ ] central line          Drug Dosing Weight  Height (cm): 149.9 (04 Apr 2025 16:03)  Weight (kg): 60 (04 Apr 2025 16:03)  BMI (kg/m2): 26.7 (04 Apr 2025 16:03)  BSA (m2): 1.55 (04 Apr 2025 16:03)    Vital Signs Last 24 Hrs  T(F): 97.3 (04-04-25 @ 16:03), Max: 98.6 (04-04-25 @ 13:30)    Vital Signs Last 24 Hrs  HR: 68 (04-04-25 @ 16:03) (68 - 90)  BP: 149/77 (04-04-25 @ 16:03) (148/54 - 149/77)  RR: 18 (04-04-25 @ 16:03)  SpO2: 98% (04-04-25 @ 16:03) (97% - 98%)  Wt(kg): --                          11.2   5.54  )-----------( 197      ( 04 Apr 2025 14:57 )             34.0       04-04    138  |  105  |  31[H]  ----------------------------<  79  4.2   |  20[L]  |  1.39[H]    Ca    9.7      04 Apr 2025 14:57        Urinalysis Basic - ( 04 Apr 2025 14:57 )    Color: x / Appearance: x / SG: x / pH: x  Gluc: 79 mg/dL / Ketone: x  / Bili: x / Urobili: x   Blood: x / Protein: x / Nitrite: x   Leuk Esterase: x / RBC: x / WBC x   Sq Epi: x / Non Sq Epi: x / Bacteria: x        MICROBIOLOGY:    RADIOLOGY:    CT chest on 03/14/2025: - Trace ground-glass densities of right upper lobe.- Mild ground-glass densities of left upper lobe.- Mild relative volume loss of left lower lobe.- Multifocal pleural loculated fluid as follows:- Left mid hemithorax, major fissure, 4.0 x 2.0 cm- Left lower hemithorax, posterolaterally, 9.0 x 4.0 cmMultifocal pleural irregular thickening as follows:- Left mid hemithorax, medially, moderate- Left lower hemithorax, medially, mild to moderate- Left lower hemithorax, posteriorly, mild- Left lower hemithorax, laterally, mild      PET/CT on 03/25/2025: - Loculated small left pleural effusion with patchy pleural thickenings (SUV 4.5), medial left lower lobe opacity/mass 3.2 x 1.8 cm, avid SUV 12.7. These are slightly smaller since the chest CT. The findings are suspicious for pneumonia with parapneumonic effusion, versus medial left lower lobe lung malignancy with pleuralinvolvement.- There is no FDG avid hilar or mediastinal lymphadenopathy.- Small groundglass densities in the left and right upper lobes are nonavid. There is no FDG avid lesion in the rightlung. 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 She stated it felt like a "gas bubble".   She subsequently developed left sided chest discomfort that was worse with deep inspiration.  She did note having a mild respiratory tract infection in February.     She was found to have pleural effusion incidentally on CT A/P on 03/07/2025 during work up for abdominal pain.    Subsequent CT chest and PET scan showed a loculated effusion on the left side  PET also showed uptake at colon hepatic  flexure and cecum.    S/p left sided thoracentesis on 4/3  Polymorphonuclear cells and gram negative demetrius were noted on gram stain.  Culture is testing    Patient is pcn allergic        PAST MEDICAL & SURGICAL HISTORY:  Hypertension  High cholesterol  Ulcerative colitis  Anxiety  Skin lesion of right arm  No significant past surgical history          Allergies    penicillin -rash in her 40s, did not require hospitalization    Intolerances        ANTIMICROBIALS:      OTHER MEDS:  acetaminophen     Tablet .. 650 milliGRAM(s) Oral every 6 hours PRN  heparin   Injectable 5000 Unit(s) SubCutaneous every 12 hours      SOCIAL HISTORY:  former smoker  retired, working in accounting department  Has two children    FAMILY HISTORY:  No pertinent family history in first degree relatives        REVIEW OF SYSTEMS  [  ] ROS unobtainable because:    [ x ] All other systems negative except as noted below:	    Constitutional:  [ ] fever [ ] chills  x[ ] weight loss  [x ] weakness  Skin:  [ ] rash [ ] phlebitis	  Eyes: [ ] icterus [ ] pain  [ ] discharge	  ENMT: [ ] sore throat  [ ] thrush [ ] ulcers [ ] exudates  Respiratory: [ x] dyspnea [ ] hemoptysis [ ] cough [ ] sputum	  Cardiovascular:  [ ] chest pain [ ] palpitations [ ] edema	  Gastrointestinal:  [ ] nausea [ ] vomiting [ ] diarrhea [ ] constipation [ ] pain	  Genitourinary:  [ ] dysuria [ ] frequency [ ] hematuria [ ] discharge [ ] flank pain  [ ] incontinence  Musculoskeletal:  [ ] myalgias [ ] arthralgias [ ] arthritis  [ ] back pain  Neurological:  [ ] headache [ ] seizures  [ ] confusion/altered mental status  Psychiatric:  [ ] anxiety [ ] depression	  Hematology/Lymphatics:  [ ] lymphadenopathy  Endocrine:  [ ] adrenal [ ] thyroid  Allergic/Immunologic:	 [ ] transplant [ ] seasonal    PHYSICAL EXAM:  General: [ x] non-toxic  HEAD/EYES: [ ] PERRL [ x] white sclera [ ] icterus  ENT:  [ ] normal [ ] supple [ ] thrush [ ] pharyngeal exudate  Cardiovascular:   [ ] murmur [ x] normal [ ] PPM/AICD  Respiratory:  [x ] clear to ausculation bilaterally  GI:  [x ] soft, non-tender, normal bowel sounds  :  [ ] henriquez [ x] no CVA tenderness   Musculoskeletal:  [ ] no synovitis  Neurologic:  [ ] non-focal exam   Skin:  [ x] no rash  Lymph: [ x] no lymphadenopathy  Psychiatric:  [ ] appropriate affect [ ] alert & oriented  Lines:  [x ] no phlebitis [ ] central line          Drug Dosing Weight  Height (cm): 149.9 (04 Apr 2025 16:03)  Weight (kg): 60 (04 Apr 2025 16:03)  BMI (kg/m2): 26.7 (04 Apr 2025 16:03)  BSA (m2): 1.55 (04 Apr 2025 16:03)    Vital Signs Last 24 Hrs  T(F): 97.3 (04-04-25 @ 16:03), Max: 98.6 (04-04-25 @ 13:30)    Vital Signs Last 24 Hrs  HR: 68 (04-04-25 @ 16:03) (68 - 90)  BP: 149/77 (04-04-25 @ 16:03) (148/54 - 149/77)  RR: 18 (04-04-25 @ 16:03)  SpO2: 98% (04-04-25 @ 16:03) (97% - 98%)  Wt(kg): --                          11.2   5.54  )-----------( 197      ( 04 Apr 2025 14:57 )             34.0       04-04    138  |  105  |  31[H]  ----------------------------<  79  4.2   |  20[L]  |  1.39[H]    Ca    9.7      04 Apr 2025 14:57        Urinalysis Basic - ( 04 Apr 2025 14:57 )    Color: x / Appearance: x / SG: x / pH: x  Gluc: 79 mg/dL / Ketone: x  / Bili: x / Urobili: x   Blood: x / Protein: x / Nitrite: x   Leuk Esterase: x / RBC: x / WBC x   Sq Epi: x / Non Sq Epi: x / Bacteria: x        MICROBIOLOGY:    RADIOLOGY:    CT chest on 03/14/2025: - Trace ground-glass densities of right upper lobe.- Mild ground-glass densities of left upper lobe.- Mild relative volume loss of left lower lobe.- Multifocal pleural loculated fluid as follows:- Left mid hemithorax, major fissure, 4.0 x 2.0 cm- Left lower hemithorax, posterolaterally, 9.0 x 4.0 cmMultifocal pleural irregular thickening as follows:- Left mid hemithorax, medially, moderate- Left lower hemithorax, medially, mild to moderate- Left lower hemithorax, posteriorly, mild- Left lower hemithorax, laterally, mild      PET/CT on 03/25/2025: - Loculated small left pleural effusion with patchy pleural thickenings (SUV 4.5), medial left lower lobe opacity/mass 3.2 x 1.8 cm, avid SUV 12.7. These are slightly smaller since the chest CT. The findings are suspicious for pneumonia with parapneumonic effusion, versus medial left lower lobe lung malignancy with pleuralinvolvement.- There is no FDG avid hilar or mediastinal lymphadenopathy.- Small groundglass densities in the left and right upper lobes are nonavid. There is no FDG avid lesion in the rightlung. 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

## 2025-04-04 NOTE — H&P ADULT - ASSESSMENT
Patient is a 89 y/o F former smoker with PMHx significant for HTN, HLD, , UC on Mesalamine stopped a week ago, TIA, Skin   cancer s/p Mohs surgery x2 who is being admitted tot he thoracic surgery service for IV antibiotics. L Pleural effusion found on CT chest 3/14/2025, drained by IR 4/3/2025. Pleural fluid +PMNs, +GNR. Patient well appearing on exam today, reports some fatigue. No CP, SOB. Daughter at bedside.

## 2025-04-05 LAB
ANION GAP SERPL CALC-SCNC: 12 MMOL/L — SIGNIFICANT CHANGE UP (ref 7–14)
APTT BLD: 33.2 SEC — SIGNIFICANT CHANGE UP (ref 24.5–35.6)
BASOPHILS # BLD AUTO: 0.01 K/UL — SIGNIFICANT CHANGE UP (ref 0–0.2)
BASOPHILS NFR BLD AUTO: 0.2 % — SIGNIFICANT CHANGE UP (ref 0–2)
BUN SERPL-MCNC: 29 MG/DL — HIGH (ref 7–23)
CALCIUM SERPL-MCNC: 9.3 MG/DL — SIGNIFICANT CHANGE UP (ref 8.4–10.5)
CHLORIDE SERPL-SCNC: 107 MMOL/L — SIGNIFICANT CHANGE UP (ref 98–107)
CO2 SERPL-SCNC: 20 MMOL/L — LOW (ref 22–31)
CREAT SERPL-MCNC: 1.23 MG/DL — SIGNIFICANT CHANGE UP (ref 0.5–1.3)
EGFR: 41 ML/MIN/1.73M2 — LOW
EGFR: 41 ML/MIN/1.73M2 — LOW
EOSINOPHIL # BLD AUTO: 0.06 K/UL — SIGNIFICANT CHANGE UP (ref 0–0.5)
EOSINOPHIL NFR BLD AUTO: 1 % — SIGNIFICANT CHANGE UP (ref 0–6)
GLUCOSE SERPL-MCNC: 85 MG/DL — SIGNIFICANT CHANGE UP (ref 70–99)
HCT VFR BLD CALC: 33.2 % — LOW (ref 34.5–45)
HGB BLD-MCNC: 10.9 G/DL — LOW (ref 11.5–15.5)
IANC: 4.46 K/UL — SIGNIFICANT CHANGE UP (ref 1.8–7.4)
IMM GRANULOCYTES NFR BLD AUTO: 0.3 % — SIGNIFICANT CHANGE UP (ref 0–0.9)
INR BLD: 0.96 RATIO — SIGNIFICANT CHANGE UP (ref 0.85–1.16)
LYMPHOCYTES # BLD AUTO: 1.1 K/UL — SIGNIFICANT CHANGE UP (ref 1–3.3)
LYMPHOCYTES # BLD AUTO: 18.1 % — SIGNIFICANT CHANGE UP (ref 13–44)
MCHC RBC-ENTMCNC: 31 PG — SIGNIFICANT CHANGE UP (ref 27–34)
MCHC RBC-ENTMCNC: 32.8 G/DL — SIGNIFICANT CHANGE UP (ref 32–36)
MCV RBC AUTO: 94.3 FL — SIGNIFICANT CHANGE UP (ref 80–100)
MONOCYTES # BLD AUTO: 0.42 K/UL — SIGNIFICANT CHANGE UP (ref 0–0.9)
MONOCYTES NFR BLD AUTO: 6.9 % — SIGNIFICANT CHANGE UP (ref 2–14)
NEUTROPHILS # BLD AUTO: 4.46 K/UL — SIGNIFICANT CHANGE UP (ref 1.8–7.4)
NEUTROPHILS NFR BLD AUTO: 73.5 % — SIGNIFICANT CHANGE UP (ref 43–77)
NRBC # BLD AUTO: 0 K/UL — SIGNIFICANT CHANGE UP (ref 0–0)
NRBC # FLD: 0 K/UL — SIGNIFICANT CHANGE UP (ref 0–0)
NRBC BLD AUTO-RTO: 0 /100 WBCS — SIGNIFICANT CHANGE UP (ref 0–0)
PLATELET # BLD AUTO: 165 K/UL — SIGNIFICANT CHANGE UP (ref 150–400)
POTASSIUM SERPL-MCNC: 4.5 MMOL/L — SIGNIFICANT CHANGE UP (ref 3.5–5.3)
POTASSIUM SERPL-SCNC: 4.5 MMOL/L — SIGNIFICANT CHANGE UP (ref 3.5–5.3)
PROTHROM AB SERPL-ACNC: 11.1 SEC — SIGNIFICANT CHANGE UP (ref 9.9–13.4)
RBC # BLD: 3.52 M/UL — LOW (ref 3.8–5.2)
RBC # FLD: 13.6 % — SIGNIFICANT CHANGE UP (ref 10.3–14.5)
SODIUM SERPL-SCNC: 139 MMOL/L — SIGNIFICANT CHANGE UP (ref 135–145)
WBC # BLD: 6.07 K/UL — SIGNIFICANT CHANGE UP (ref 3.8–10.5)
WBC # FLD AUTO: 6.07 K/UL — SIGNIFICANT CHANGE UP (ref 3.8–10.5)

## 2025-04-05 PROCEDURE — G0545: CPT

## 2025-04-05 PROCEDURE — 99232 SBSQ HOSP IP/OBS MODERATE 35: CPT

## 2025-04-05 PROCEDURE — 71250 CT THORAX DX C-: CPT | Mod: 26

## 2025-04-05 RX ORDER — ONDANSETRON HCL/PF 4 MG/2 ML
4 VIAL (ML) INJECTION EVERY 6 HOURS
Refills: 0 | Status: DISCONTINUED | OUTPATIENT
Start: 2025-04-05 | End: 2025-04-06

## 2025-04-05 RX ADMIN — HEPARIN SODIUM 5000 UNIT(S): 1000 INJECTION INTRAVENOUS; SUBCUTANEOUS at 17:29

## 2025-04-05 RX ADMIN — Medication 4 MILLIGRAM(S): at 17:37

## 2025-04-05 RX ADMIN — Medication 40 MILLIGRAM(S): at 22:54

## 2025-04-05 RX ADMIN — Medication 100 MILLIGRAM(S): at 05:12

## 2025-04-05 RX ADMIN — Medication 100 MILLIGRAM(S): at 17:25

## 2025-04-05 RX ADMIN — CEFTRIAXONE 100 MILLIGRAM(S): 500 INJECTION, POWDER, FOR SOLUTION INTRAMUSCULAR; INTRAVENOUS at 17:24

## 2025-04-05 RX ADMIN — HEPARIN SODIUM 5000 UNIT(S): 1000 INJECTION INTRAVENOUS; SUBCUTANEOUS at 05:12

## 2025-04-05 RX ADMIN — Medication 40 MILLIGRAM(S): at 08:17

## 2025-04-05 RX ADMIN — Medication 4 MILLIGRAM(S): at 05:17

## 2025-04-05 NOTE — CONSULT NOTE ADULT - ASSESSMENT
A/p  89 y/o F former smoker with PMHx significant for HTN, HLD, UC on Mesalamine stopped a week ago, TIA, Skin cancer s/p Mohs surgery x2 who is being admitted tot he thoracic surgery service for IV antibiotics sp L pleural effusion drainage 4/3/25.    #C/f Empyema  -sp L pleural effusion drainage by thoracix sx on 4/3  -Pleural fluid +PMNs, +GNR.   -Cont abx per ID  -F/u blood cx    #HTN  -Continue valsartan     A/p  89 y/o F former smoker with PMHx significant for HTN, HLD, UC on Mesalamine stopped a week ago, TIA, Skin cancer s/p Mohs surgery x2 who is being admitted tot he thoracic surgery service for IV antibiotics sp L pleural effusion drainage 4/3/25.    #C/f Empyema  -sp L pleural effusion drainage by thoracix sx on 4/3  -Pleural fluid +PMNs, +GNR.   -Cont abx per ID  -F/u blood cx  -Plan for repeat CT today to eval effusion  -Endorsing some pleuritic cp - likely referred from pleural effusion, however can check echo    #HTN  -Continue valsartan      dvt ppx

## 2025-04-05 NOTE — PHYSICAL THERAPY INITIAL EVALUATION ADULT - ADDITIONAL COMMENTS
Pt states she lives alone in an apartment, elevator access, no steps to negotiate. Prior to admission pt reports being independent in ADLs and ambulation without device.     Following evaluation, pt was left sitting in chair in no distress, all lines in tact, call bell in reach. 22-Sep-2022

## 2025-04-05 NOTE — CONSULT NOTE ADULT - TIME BILLING
A/p  89 y/o F former smoker with PMHx significant for HTN, HLD, UC on Mesalamine stopped a week ago, TIA, Skin cancer s/p Mohs surgery x2 who is being admitted tot he thoracic surgery service for IV antibiotics sp L pleural effusion drainage 4/3/25.    #pleural effusion, C/f Empyema  -L pleural effusion drainage by thoracix sx on 4/3  -Pleural fluid +PMNs, +GNR.   -abx per ID  -F/u blood cx  -serial ct imaging per thoracic   -chest pain, atypical pleuritic, likely secondary to pleurisy  -can check echo if persists, worsens, f/u ct chest r/o pericardial effusion  #HTN  -Continue valsartan  dvt ppx  care per thoracic

## 2025-04-05 NOTE — PHYSICAL THERAPY INITIAL EVALUATION ADULT - DIAGNOSIS, PT EVAL
Assessment/Plan:  Keep incision clean and dry  Continue current restrictions. Do not lift >10lbs  Do not submerge in water   Limit exercise to walking  Continue to monitor emotions  Patient to call for concerns  Continue to monitor BP at home  RTO 1 week for BP check  RTO in 4 weeks for 6 week PP visit     Diagnoses and all orders for this visit:    Postpartum examination following  delivery    Chronic hypertension          Subjective:      Patient ID: Tavo Melendez is a 36 y.o. female. Sreekanth Huynh is a 34YO [id number] WF presenting to the office for an incision check 8 days s/p RLTCS. She is feeling well today. Her pregnancy was complicated by [facility]. She is currently on Labetalol which was increased to 400 BID yesterday and lasix added due to elevated BP readings at home and sx of Headache. Today, patient is feeling well. She denies HA or visual changes. Her BP today is normal. She is continuing to monitor at home. She denies fevers, chills or pain at incision. She states her appetite is getting back to normal. She reports normal bowel and bladder function. She has a good support system at home and is not breastfeeding. The following portions of the patient's history were reviewed and updated as appropriate:   She  has a past medical history of Hypertension, Seasonal allergies, and Varicella. She   Patient Active Problem List    Diagnosis Date Noted   • Fetal macrosomia during pregnancy 2023   • Maternal morbid obesity, antepartum ([address]) 06/15/2023   • Status post repeat low transverse  section 2023   • Chronic hypertension affecting pregnancy 2023   • History of  section 2023   • Obesity affecting pregnancy 2023   • Hx of preeclampsia, prior pregnancy, currently pregnant, third trimester 2023     She  has a past surgical history that includes [place] tooth extraction;  section (2019); and pr  delivery only (N/A, 2023). Her family history includes COPD in her mother; Dementia in her paternal grandfather and paternal grandmother; Diabetes in her mother; Heart attack in her maternal grandfather and maternal grandmother; Hyperlipidemia in her father; Hypertension in her mother; No Known Problems in her daughter and sister. She  reports that she has never smoked. She has never used smokeless tobacco. She reports that she does not currently use alcohol. She reports that she does not use drugs. Current Outpatient Medications   Medication Sig Dispense Refill   • furosemide (Lasix) 20 mg tablet Take 1 tablet (20 mg total) by mouth daily for 5 days 5 tablet 0   • labetalol (NORMODYNE) 200 mg tablet Take 2 tablets (400 mg total) by mouth every 12 (twelve) hours 120 tablet 2   • Prenatal Vit-Fe Fumarate-FA (PRENATAL VITAMIN PO) Take by mouth       No current facility-administered medications for this visit. .    Review of Systems   Constitutional: Negative for chills and fever. Eyes: Negative for visual disturbance. Respiratory: Negative for shortness of breath. Cardiovascular: Negative for chest pain. Gastrointestinal: Negative for abdominal pain, constipation, diarrhea and nausea. Skin: Negative for rash. Neurological: Negative for headaches. Psychiatric/Behavioral: Negative for dysphoric mood. The patient is not nervous/anxious. Objective:      /88 (BP Location: Right arm, Patient Position: Sitting, Cuff Size: Large)   Ht 5' 1" (1.549 m)   Wt 131 kg (289 lb)   LMP 11/28/2022 (Exact Date)   Breastfeeding No   BMI 54.61 kg/m²          Physical Exam  Vitals reviewed. Constitutional:       General: She is not in acute distress. Appearance: Normal appearance. She is obese. She is not ill-appearing, toxic-appearing or diaphoretic. HENT:      Head: Normocephalic and atraumatic. Pulmonary:      Effort: Pulmonary effort is normal.   Abdominal:      General: Abdomen is flat.  A surgical scar is present. There is no distension. Palpations: Abdomen is soft. Tenderness: There is no abdominal tenderness. Comments: Incision clean and dry. No warmth or surrounding erythema. No oozing, bleeding or drainage from incision. Residual glue overlying incision. Skin:     General: Skin is warm and dry. Findings: No lesion or rash. Neurological:      General: No focal deficit present. Mental Status: She is alert. Psychiatric:         Mood and Affect: Mood normal.         Behavior: Behavior normal.         Thought Content:  Thought content normal. Generalized weakness, impaired balance

## 2025-04-05 NOTE — CONSULT NOTE ADULT - SUBJECTIVE AND OBJECTIVE BOX
CARDIOLOGY CONSULT - Dr. Tse         HPI:  Patient is a 89 y/o F former smoker with PMHx significant for HTN, HLD, , UC on Mesalamine stopped a week ago, TIA, Skin   cancer s/p Mohs surgery x2 who is being admitted tot he thoracic surgery service for IV antibiotics. Patient had an incidental finding of pleural effusion on CT A/P 3/7/2025 done during a w/u for abdominal pain. Patient had follow-up formal imaging shown below:    CT chest on 03/14/2025:   - Trace ground-glass densities of right upper lobe.  - Mild ground-glass densities of left upper lobe.  - Mild relative volume loss of left lower lobe.  - Multifocal pleural loculated fluid as follows:  - Left mid hemithorax, major fissure, 4.0 x 2.0 cm  - Left lower hemithorax, posterolaterally, 9.0 x 4.0 cm  Multifocal pleural irregular thickening as follows:  - Left mid hemithorax, medially, moderate  - Left lower hemithorax, medially, mild to moderate  - Left lower hemithorax, posteriorly, mild  - Left lower hemithorax, laterally, mild    PET/CT on 03/25/2025:   - Loculated small left pleural effusion with patchy pleural thickenings (SUV 4.5), medial left lower lobe opacity/mass 3.2 x 1.8 cm, avid   SUV 12.7. These are slightly smaller since the chest CT. The findings are suspicious for pneumonia with parapneumonic effusion,   versus medial left lower lobe lung malignancy with pleural involvement.  - There is no FDG avid hilar or mediastinal lymphadenopathy.  - Small groundglass densities in the left and right upper lobes are nonavid. There is no FDG avid lesion in the right lung. There is no   pleural effusion on the right.  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    Patient evaluated by Dr. No in office 3/31/2025 and she was sent for IR guided drainage of pleural fluid. Sent sent to IR Dr. Louise for a diagnostic US guided left thoracentesis on 4/3/2025. Pleural fluid positive for PMNs and Gram negative rods.    On exam today, patient appears well. Patient has no complaints at this time. Per pt daughter who is at the bedside, patient has "not been herself" since early March. Patient states prior to drainage of fluid, she would occasionally have pain with deep breathing. Denies fevers, chills, body aces, CP, SOB, N/V/D.       (04 Apr 2025 16:37)      PAST MEDICAL & SURGICAL HISTORY:  Hypertension      High cholesterol      Ulcerative colitis      Anxiety      Skin lesion of right arm      Pleural effusion      History of Mohs micrographic surgery for skin cancer              PREVIOUS DIAGNOSTIC TESTING:    [ ] Echocardiogram:  < from: Transthoracic Echocardiogram (02.05.16 @ 16:11) >  1. Mitral annular calcificationand calcified mitral  leaflets with normal diastolic opening. Minimal mitral  regurgitation.  2. Normal left ventricular systolic function. No segmental  wall motion abnormalities.  3. Mild diastolic dysfunction (Stage I).  4. Right ventricular enlargement with normal right  ventricular systolic function.  *** No previous Echo exam.    < end of copied text >    [ ]  Catheterization:  [ ] Stress Test:  	    MEDICATIONS:  Home Medications:  simvastatin 40 mg oral tablet: 1 tab(s) orally once a day (04 Apr 2025 17:00)  valsartan 40 mg oral tablet: 1 tab(s) orally once a day (04 Apr 2025 17:00)      MEDICATIONS  (STANDING):  cefTRIAXone   IVPB 2000 milliGRAM(s) IV Intermittent every 24 hours  heparin   Injectable 5000 Unit(s) SubCutaneous every 12 hours  metroNIDAZOLE  IVPB 500 milliGRAM(s) IV Intermittent every 12 hours  simvastatin 40 milliGRAM(s) Oral at bedtime  valsartan 40 milliGRAM(s) Oral daily      FAMILY HISTORY:  No pertinent family history in first degree relatives        SOCIAL HISTORY:    [x] Non-smoker  [ ] Smoker  [ ] Alcohol    Allergies    penicillin (Unknown)    Intolerances    	    REVIEW OF SYSTEMS:  CONSTITUTIONAL: No fever, weight loss, or fatigue  EYES: No eye pain, visual disturbances, or discharge  ENMT:  No difficulty hearing, tinnitus, vertigo; No sinus or throat pain  NECK: No pain or stiffness  RESPIRATORY: No cough, wheezing, chills or hemoptysis; No Shortness of Breath  CARDIOVASCULAR: No chest pain, palpitations, passing out, dizziness, or leg swelling  GASTROINTESTINAL: No abdominal or epigastric pain. No nausea, vomiting, or hematemesis; No diarrhea or constipation. No melena or hematochezia.  GENITOURINARY: No dysuria, frequency, hematuria, or incontinence  NEUROLOGICAL: No headaches, memory loss, loss of strength, numbness, or tremors  SKIN: No itching, burning, rashes, or lesions   	    [ ] All others negative	  [ ] Unable to obtain    PHYSICAL EXAM:  T(C): 36.5 (04-05-25 @ 04:24), Max: 37 (04-04-25 @ 13:30)  HR: 72 (04-05-25 @ 04:24) (66 - 90)  BP: 154/52 (04-05-25 @ 04:24) (101/55 - 154/63)  RR: 16 (04-05-25 @ 04:24) (16 - 18)  SpO2: 98% (04-05-25 @ 04:24) (97% - 100%)  Wt(kg): --  I&O's Summary    04 Apr 2025 07:01  -  05 Apr 2025 07:00  --------------------------------------------------------  IN: 500 mL / OUT: 0 mL / NET: 500 mL        Appearance: Normal	  Psychiatry: A & O x 3, Mood & affect appropriate  HEENT:   Normal oral mucosa, PERRL, EOMI	  Lymphatic: No lymphadenopathy  Cardiovascular: Normal S1 S2,RRR, No JVD, No murmurs  Respiratory: Lungs clear to auscultation	  Gastrointestinal:  Soft, Non-tender, + BS	  Skin: No rashes, No ecchymoses, No cyanosis	  Neurologic: Non-focal  Extremities: Normal range of motion, No clubbing, cyanosis or edema  Vascular: Peripheral pulses palpable 2+ bilaterally    TELEMETRY: 	    ECG:  	  RADIOLOGY:  OTHER: 	  	  LABS:	 	    CARDIAC MARKERS:                                  10.9   6.07  )-----------( 165      ( 05 Apr 2025 05:34 )             33.2     04-04    138  |  105  |  31[H]  ----------------------------<  79  4.2   |  20[L]  |  1.39[H]    Ca    9.7      04 Apr 2025 14:57      PT/INR - ( 05 Apr 2025 05:34 )   PT: 11.1 sec;   INR: 0.96 ratio         PTT - ( 05 Apr 2025 05:34 )  PTT:33.2 sec  proBNP:   Lipid Profile:   HgA1c:   TSH:        CARDIOLOGY CONSULT - Dr. Tse         HPI:  Patient is a 89 y/o F former smoker with PMHx significant for HTN, HLD, , UC on Mesalamine stopped a week ago, TIA, Skin   cancer s/p Mohs surgery x2 who is being admitted tot he thoracic surgery service for IV antibiotics. Patient had an incidental finding of pleural effusion on CT A/P 3/7/2025 done during a w/u for abdominal pain. Patient had follow-up formal imaging shown below:    CT chest on 03/14/2025:   - Trace ground-glass densities of right upper lobe.  - Mild ground-glass densities of left upper lobe.  - Mild relative volume loss of left lower lobe.  - Multifocal pleural loculated fluid as follows:  - Left mid hemithorax, major fissure, 4.0 x 2.0 cm  - Left lower hemithorax, posterolaterally, 9.0 x 4.0 cm  Multifocal pleural irregular thickening as follows:  - Left mid hemithorax, medially, moderate  - Left lower hemithorax, medially, mild to moderate  - Left lower hemithorax, posteriorly, mild  - Left lower hemithorax, laterally, mild    PET/CT on 03/25/2025:   - Loculated small left pleural effusion with patchy pleural thickenings (SUV 4.5), medial left lower lobe opacity/mass 3.2 x 1.8 cm, avid   SUV 12.7. These are slightly smaller since the chest CT. The findings are suspicious for pneumonia with parapneumonic effusion,   versus medial left lower lobe lung malignancy with pleural involvement.  - There is no FDG avid hilar or mediastinal lymphadenopathy.  - Small groundglass densities in the left and right upper lobes are nonavid. There is no FDG avid lesion in the right lung. There is no   pleural effusion on the right.  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    Patient evaluated by Dr. No in office 3/31/2025 and she was sent for IR guided drainage of pleural fluid. Sent sent to IR Dr. Louise for a diagnostic US guided left thoracentesis on 4/3/2025. Pleural fluid positive for PMNs and Gram negative rods.    On exam today, patient appears well. Patient has no complaints at this time. Per pt daughter who is at the bedside, patient has "not been herself" since early March. Patient states prior to drainage of fluid, she would occasionally have pain with deep breathing. Denies fevers, chills, body aces, CP, SOB, N/V/D.       (04 Apr 2025 16:37)      PAST MEDICAL & SURGICAL HISTORY:  Hypertension      High cholesterol      Ulcerative colitis      Anxiety      Skin lesion of right arm      Pleural effusion      History of Mohs micrographic surgery for skin cancer              PREVIOUS DIAGNOSTIC TESTING:    [ ] Echocardiogram:  < from: Transthoracic Echocardiogram (02.05.16 @ 16:11) >  1. Mitral annular calcificationand calcified mitral  leaflets with normal diastolic opening. Minimal mitral  regurgitation.  2. Normal left ventricular systolic function. No segmental  wall motion abnormalities.  3. Mild diastolic dysfunction (Stage I).  4. Right ventricular enlargement with normal right  ventricular systolic function.  *** No previous Echo exam.    < end of copied text >    [ ]  Catheterization:  [ ] Stress Test:  	    MEDICATIONS:  Home Medications:  simvastatin 40 mg oral tablet: 1 tab(s) orally once a day (04 Apr 2025 17:00)  valsartan 40 mg oral tablet: 1 tab(s) orally once a day (04 Apr 2025 17:00)      MEDICATIONS  (STANDING):  cefTRIAXone   IVPB 2000 milliGRAM(s) IV Intermittent every 24 hours  heparin   Injectable 5000 Unit(s) SubCutaneous every 12 hours  metroNIDAZOLE  IVPB 500 milliGRAM(s) IV Intermittent every 12 hours  simvastatin 40 milliGRAM(s) Oral at bedtime  valsartan 40 milliGRAM(s) Oral daily      FAMILY HISTORY:  No pertinent family history in first degree relatives        SOCIAL HISTORY:    [x] Non-smoker  [ ] Smoker  [ ] Alcohol    Allergies    penicillin (Unknown)    Intolerances    	    REVIEW OF SYSTEMS:  CONSTITUTIONAL: No fever, weight loss, or fatigue  EYES: No eye pain, visual disturbances, or discharge  ENMT:  No difficulty hearing, tinnitus, vertigo; No sinus or throat pain  NECK: No pain or stiffness  RESPIRATORY: No cough, wheezing, chills or hemoptysis; No Shortness of Breath  CARDIOVASCULAR: +Pleuritic pain, No chest pain, palpitations, passing out, dizziness, or leg swelling  GASTROINTESTINAL: No abdominal or epigastric pain. No nausea, vomiting, or hematemesis; No diarrhea or constipation. No melena or hematochezia.  GENITOURINARY: No dysuria, frequency, hematuria, or incontinence  NEUROLOGICAL: No headaches, memory loss, loss of strength, numbness, or tremors  SKIN: No itching, burning, rashes, or lesions   	    [x] All others negative	  [ ] Unable to obtain    PHYSICAL EXAM:  T(C): 36.5 (04-05-25 @ 04:24), Max: 37 (04-04-25 @ 13:30)  HR: 72 (04-05-25 @ 04:24) (66 - 90)  BP: 154/52 (04-05-25 @ 04:24) (101/55 - 154/63)  RR: 16 (04-05-25 @ 04:24) (16 - 18)  SpO2: 98% (04-05-25 @ 04:24) (97% - 100%)  Wt(kg): --  I&O's Summary    04 Apr 2025 07:01  -  05 Apr 2025 07:00  --------------------------------------------------------  IN: 500 mL / OUT: 0 mL / NET: 500 mL        Appearance: Normal	  Psychiatry: A & O x 3, Mood & affect appropriate  HEENT:   Normal oral mucosa, PERRL, EOMI	  Lymphatic: No lymphadenopathy  Cardiovascular: Normal S1 S2,RRR, No JVD, No murmurs  Respiratory: Diminished L side throughout, otherwise cta   Gastrointestinal:  Soft, Non-tender, + BS	  Skin: No rashes, No ecchymoses, No cyanosis	  Neurologic: Non-focal  Extremities: Normal range of motion, No clubbing, cyanosis or edema  Vascular: Peripheral pulses palpable 2+ bilaterally    TELEMETRY: NSR - no events 	    ECG:  	  RADIOLOGY:  OTHER: 	  	  LABS:	 	    CARDIAC MARKERS:                                  10.9   6.07  )-----------( 165      ( 05 Apr 2025 05:34 )             33.2     04-04    138  |  105  |  31[H]  ----------------------------<  79  4.2   |  20[L]  |  1.39[H]    Ca    9.7      04 Apr 2025 14:57      PT/INR - ( 05 Apr 2025 05:34 )   PT: 11.1 sec;   INR: 0.96 ratio         PTT - ( 05 Apr 2025 05:34 )  PTT:33.2 sec  proBNP:   Lipid Profile:   HgA1c:   TSH:

## 2025-04-06 VITALS
OXYGEN SATURATION: 98 % | DIASTOLIC BLOOD PRESSURE: 57 MMHG | RESPIRATION RATE: 18 BRPM | TEMPERATURE: 98 F | HEART RATE: 73 BPM | SYSTOLIC BLOOD PRESSURE: 139 MMHG

## 2025-04-06 LAB
ANION GAP SERPL CALC-SCNC: 13 MMOL/L — SIGNIFICANT CHANGE UP (ref 7–14)
BUN SERPL-MCNC: 25 MG/DL — HIGH (ref 7–23)
CALCIUM SERPL-MCNC: 8.9 MG/DL — SIGNIFICANT CHANGE UP (ref 8.4–10.5)
CHLORIDE SERPL-SCNC: 108 MMOL/L — HIGH (ref 98–107)
CO2 SERPL-SCNC: 19 MMOL/L — LOW (ref 22–31)
CREAT SERPL-MCNC: 1.36 MG/DL — HIGH (ref 0.5–1.3)
EGFR: 37 ML/MIN/1.73M2 — LOW
EGFR: 37 ML/MIN/1.73M2 — LOW
GLUCOSE SERPL-MCNC: 87 MG/DL — SIGNIFICANT CHANGE UP (ref 70–99)
HCT VFR BLD CALC: 33.1 % — LOW (ref 34.5–45)
HGB BLD-MCNC: 10.6 G/DL — LOW (ref 11.5–15.5)
MCHC RBC-ENTMCNC: 30.2 PG — SIGNIFICANT CHANGE UP (ref 27–34)
MCHC RBC-ENTMCNC: 32 G/DL — SIGNIFICANT CHANGE UP (ref 32–36)
MCV RBC AUTO: 94.3 FL — SIGNIFICANT CHANGE UP (ref 80–100)
NRBC # BLD AUTO: 0 K/UL — SIGNIFICANT CHANGE UP (ref 0–0)
NRBC # FLD: 0 K/UL — SIGNIFICANT CHANGE UP (ref 0–0)
NRBC BLD AUTO-RTO: 0 /100 WBCS — SIGNIFICANT CHANGE UP (ref 0–0)
PLATELET # BLD AUTO: 165 K/UL — SIGNIFICANT CHANGE UP (ref 150–400)
POTASSIUM SERPL-MCNC: 4.8 MMOL/L — SIGNIFICANT CHANGE UP (ref 3.5–5.3)
POTASSIUM SERPL-SCNC: 4.8 MMOL/L — SIGNIFICANT CHANGE UP (ref 3.5–5.3)
RBC # BLD: 3.51 M/UL — LOW (ref 3.8–5.2)
RBC # FLD: 13.6 % — SIGNIFICANT CHANGE UP (ref 10.3–14.5)
SODIUM SERPL-SCNC: 140 MMOL/L — SIGNIFICANT CHANGE UP (ref 135–145)
WBC # BLD: 5.91 K/UL — SIGNIFICANT CHANGE UP (ref 3.8–10.5)
WBC # FLD AUTO: 5.91 K/UL — SIGNIFICANT CHANGE UP (ref 3.8–10.5)

## 2025-04-06 PROCEDURE — 99238 HOSP IP/OBS DSCHRG MGMT 30/<: CPT

## 2025-04-06 PROCEDURE — G0545: CPT

## 2025-04-06 PROCEDURE — 99232 SBSQ HOSP IP/OBS MODERATE 35: CPT

## 2025-04-06 RX ORDER — ACETAMINOPHEN 500 MG/5ML
2 LIQUID (ML) ORAL
Qty: 0 | Refills: 0 | DISCHARGE
Start: 2025-04-06

## 2025-04-06 RX ADMIN — Medication 40 MILLIGRAM(S): at 06:08

## 2025-04-06 RX ADMIN — HEPARIN SODIUM 5000 UNIT(S): 1000 INJECTION INTRAVENOUS; SUBCUTANEOUS at 06:09

## 2025-04-06 RX ADMIN — Medication 100 MILLIGRAM(S): at 06:04

## 2025-04-06 NOTE — DISCHARGE NOTE PROVIDER - NSDCCPCAREPLAN_GEN_ALL_CORE_FT
PRINCIPAL DISCHARGE DIAGNOSIS  Diagnosis: Pleural effusion, not elsewhere classified-J90  Assessment and Plan of Treatment: Community Status: Active

## 2025-04-06 NOTE — DISCHARGE NOTE NURSING/CASE MANAGEMENT/SOCIAL WORK - NSDCFUADDAPPT_GEN_ALL_CORE_FT
Dr. No's office will contact you about setting up a Tele Health visit for Monday April 14th. Any questions call [phone]  See infectious disease, Dr. Gardiner as needed

## 2025-04-06 NOTE — PROGRESS NOTE ADULT - ASSESSMENT
A/p  89 y/o F former smoker with PMHx significant for HTN, HLD, UC on Mesalamine stopped a week ago, TIA, Skin cancer s/p Mohs surgery x2 who is being admitted tot he thoracic surgery service for IV antibiotics sp L pleural effusion drainage 4/3/25.    #C/f Empyema  -sp L pleural effusion drainage by thoracix sx on 4/3  -Pleural fluid +PMNs, +GNR.   -Cont abx per ID  -F/u blood cx  -Repeat CT chest with small L pleural effusion with L sided pleural thickening - overall unchanged from prior   -F/u further thoracic recs   -Endorsing some pleuritic cp - likely referred from pleural effusion, however can check echo    #HTN  -Continue valsartan      dvt ppx  
89 year old with a history of skin cancer and UC (not on immunosuppressive treatment) presents with a loculated left sided pleural effusion. She had a thoracentesis with gram negative rods on gram stain.   Concern for parapneumonic effusion    Completed 7 days of Levaquin outpatient prior to admission   Born in [place]. Former smoker (age 19 years to age 51)  Denies any recent fevers. chills. night sweats and weight loss   No exposure to anyone with active TB  No recent travel   Not on any steroids/other Immunosuppressive agents     Recommendations:  - Can stop CTX and Metro and monitor off   - 04/03 corrected reports , negative gram stain  - Can F/U cytopathology from thoracentesis done on 4/3  (test results from this procedure are in HIE)  -Follow up chest CT as per thoracic   -PCN allergic- mild reaction/ decades ago        In addition to reviewing history, imaging, documents, labs, microbiology, took into account antibiotic stewardship, local antibiogram and infection control strategies and potential transmission issues.    Discussed with the primary team.    Capri Hester MD  Attending Physician, Division of Infectious Diseases  Department of Medicine   [facility]    Contact on TEAMS messaging from 9am - 5pm  Office: [phone] (after 5 PM or weekend)        
89 year old with a history of skin cancer and UC (not on immunosuppressive treatment) presents with a loculated left sided pleural effusion. She had a thoracentesis with gram negative rods on gram stain.     Concern for parapneumonic effusion/ empyema.     Recommendations:  - Continue Ceftriaxone 2 gram iv daily with metronidazole 500 mg iv q 12  -Follow up culture results and cytopathology from thoracentesis done on 4/3  (test results from this procedure are in HIE)  -check blood cultures times two   -Follow up chest CT  -Check QuantiFeron  -PCN allergic- mild reaction/ decades ago        In addition to reviewing history, imaging, documents, labs, microbiology, took into account antibiotic stewardship, local antibiogram and infection control strategies and potential transmission issues.    Discussed with the primary team.    Capri Hester MD  Attending Physician, Division of Infectious Diseases  Department of Medicine   [facility]    Contact on TEAMS messaging from 9am - 5pm  Office: [phone] (after 5 PM or weekend)    
Patient is a 89 y/o F former smoker with PMHx significant for HTN, HLD, , UC on Mesalamine stopped a week ago, TIA, Skin   cancer s/p Mohs surgery x2 who is being admitted tot he thoracic surgery service for IV antibiotics. L Pleural effusion found on CT chest 3/14/2025, drained by IR 4/3/2025. Pleural fluid +PMNs, +GNR.     Plan:   - FU ID recs  - Continue ceftriaxone and flagyl  - Continue home meds  - Will follow up CT chest non-contrast to assess further need for chest drainage     Thoracic Surgery   [id number]

## 2025-04-06 NOTE — DISCHARGE NOTE PROVIDER - NSDCMRMEDTOKEN_GEN_ALL_CORE_FT
acetaminophen 325 mg oral tablet: 2 tab(s) orally every 6 hours As needed Temp greater or equal to 38C (100.4F), Mild Pain (1 - 3)  simvastatin 40 mg oral tablet: 1 tab(s) orally once a day  valsartan 40 mg oral tablet: 1 tab(s) orally once a day

## 2025-04-06 NOTE — DISCHARGE NOTE PROVIDER - NSDCFUADDINST_GEN_ALL_CORE_FT
Activity as tolerated. Contact Dr. No with any fevers, worsening shortness of breath or increased pain.

## 2025-04-06 NOTE — DISCHARGE NOTE NURSING/CASE MANAGEMENT/SOCIAL WORK - FINANCIAL ASSISTANCE
[facility] provides services at a reduced cost to those who are determined to be eligible through [facility]’s financial assistance program. Information regarding [facility]’s financial assistance program can be found by going to [email or web address] or by calling [phone].

## 2025-04-06 NOTE — DISCHARGE NOTE NURSING/CASE MANAGEMENT/SOCIAL WORK - PATIENT PORTAL LINK FT
You can access the FollowMyHealth Patient Portal offered by [facility] by registering at the following website: [email or web address]. By joining [facility]’s FollowMyHealth portal, you will also be able to view your health information using other applications (apps) compatible with our system.

## 2025-04-06 NOTE — PROGRESS NOTE ADULT - TIME BILLING
Agree with above ACP note.  cv stable  care per thoracic   dcp   chest pain, atypical, sec to pulmonary process  f/u echo, can be done as outpt

## 2025-04-06 NOTE — CHART NOTE - NSCHARTNOTEFT_GEN_A_CORE
Chart reviewed.    Previous gram stain revised- now reports as pmn  present, but no organism  Culture is without growth    Afebrile, normal WBC, non-toxic appearing    Can monitor off antibiotics    Follow up as outpt     [phone]

## 2025-04-06 NOTE — DISCHARGE NOTE PROVIDER - CARE PROVIDERS DIRECT ADDRESSES
[email or web address]

## 2025-04-06 NOTE — DISCHARGE NOTE PROVIDER - NSDCFUSCHEDAPPT_GEN_ALL_CORE_FT
[place] No  [facility] Physician [facility] [phone]  Scheduled Appointment: 04/14/2025

## 2025-04-06 NOTE — PROGRESS NOTE ADULT - SUBJECTIVE AND OBJECTIVE BOX
Subjective: "I have pain along my left back, it's a little better but still there" Pt states feeling better since admission but "not great" Pt states dry cough. NO CP or SOB. No fever or chills. OOB w/ minimal assist.     Vital Signs:  Vital Signs Last 24 Hrs  T(C): 36.9 (04-06-25 @ 05:05), Max: 36.9 (04-06-25 @ 05:05)  T(F): 98.5 (04-06-25 @ 05:05), Max: 98.5 (04-06-25 @ 05:05)  HR: 70 (04-06-25 @ 05:05) (64 - 77)  BP: 151/58 (04-06-25 @ 05:05) (132/45 - 159/69)  RR: 18 (04-06-25 @ 05:05) (18 - 18)  SpO2: 95% (04-06-25 @ 05:05) (95% - 100%) on (O2)    Telemetry/Alarms: SR  Relevant labs, radiology and Medications reviewed           CT scan done, sml effusion with pleural thickening.              10.6   5.91  )-----------( 165      ( 06 Apr 2025 05:44 )             33.1     04-06    140  |  108[H]  |  25[H]  ----------------------------<  87  4.8   |  19[L]  |  1.36[H]    Ca    8.9      06 Apr 2025 05:44      PT/INR - ( 05 Apr 2025 05:34 )   PT: 11.1 sec;   INR: 0.96 ratio         PTT - ( 05 Apr 2025 05:34 )  PTT:33.2 sec  MEDICATIONS  (STANDING):  cefTRIAXone   IVPB 2000 milliGRAM(s) IV Intermittent every 24 hours  heparin   Injectable 5000 Unit(s) SubCutaneous every 12 hours  metroNIDAZOLE  IVPB 500 milliGRAM(s) IV Intermittent every 12 hours  simvastatin 40 milliGRAM(s) Oral at bedtime  valsartan 40 milliGRAM(s) Oral daily    MEDICATIONS  (PRN):  acetaminophen     Tablet .. 650 milliGRAM(s) Oral every 6 hours PRN Temp greater or equal to 38C (100.4F), Mild Pain (1 - 3)  ondansetron Injectable 4 milliGRAM(s) IV Push every 6 hours PRN Nausea and/or Vomiting    General: WD NAD  Neurology: A&Ox3, nonfocal, MARK x 4  Eyes: PERRLA/ EOMI, Gross vision intact  ENT/Neck: Neck supple, trachea midline, No JVD, Gross hearing intact  Respiratory: CTA B/L, No wheezing, rales, rhonchi, dec'd BS To left side.   CV: RRR, S1S2, no murmurs, rubs or gallops  Abdominal: Soft, NT, ND +BS, + BM yesterday  Extremities: No edema, + peripheral pulses  Skin: No Rashes, Hematoma, Ecchymosis    I&O's Summary    05 Apr 2025 07:01  -  06 Apr 2025 07:00  --------------------------------------------------------  IN: 775 mL / OUT: 950 mL / NET: -175 mL    Pleural fluid culture from outside thoracentesis now No growth and final.     Assessment  89y Female  w/ PAST MEDICAL & SURGICAL HISTORY:  Hypertension      High cholesterol      Ulcerative colitis      Anxiety      Skin lesion of right arm      Pleural effusion      History of Mohs micrographic surgery for skin cancer  Patient is a 89 y/o F former smoker with PMHx significant for HTN, HLD, , UC on Mesalamine stopped a week ago, TIA, Skin   cancer s/p Mohs surgery x2 who is being admitted tot he thoracic surgery service for IV antibiotics. L Pleural effusion found on CT chest 3/14/2025, drained by IR 4/3/2025. Pleural fluid +PMNs, +GNR.   4/5-ID consulted. On IV antibiotics. Repeat CT scan ordered. 4/6-CT scan with small effusion. Culture results changed to no growth and final, ID made aware.       PLAN  Neuro: Pain management  Pulm: Encourage coughing, deep breathing and use of incentive spirometry. CT scan done, will review with DR. No. Small effusion seen.   Cardio: Monitor telemetry/alarms  GI: Tolerating diet. Continue stool softeners.  Renal: monitor urine output, supplement electrolytes as needed  Vasc: Heparin SC/SCDs for DVT prophylaxis  Heme: Stable H/H. .   ID: On Ceftriaxone and flagyl. Will notify ID final culture results.   Therapy: OOB/ambulate  Disposition: Aim to D/C to home once medically cleared,   Discussed with Cardiothoracic Team at AM rounds.  
Patient is a 89y old  Female who presents with a chief complaint of IV antibiotics (06 Apr 2025 09:36)    Being followed by ID for Pleural effusion     Interval history:  Reports nausea otherwise no other complaints today   WBC stable   Remains afebrile   Gram stain with corrected report- no organisms   Patient explained about the findings and she verbalized understanding       ROS:  No N/V/D  No abd pain  No urinary complaints  No HA  No joint or limb pain  No other complaints    Antimicrobials:  cefTRIAXone   IVPB 2000 milliGRAM(s) IV Intermittent every 24 hours  metroNIDAZOLE  IVPB 500 milliGRAM(s) IV Intermittent every 12 hours      Vital Signs Last 24 Hrs  T(C): 36.9 (04-06-25 @ 08:40), Max: 36.9 (04-06-25 @ 05:05)  T(F): 98.4 (04-06-25 @ 08:40), Max: 98.5 (04-06-25 @ 05:05)  HR: 83 (04-06-25 @ 08:40) (64 - 83)  BP: 134/50 (04-06-25 @ 08:40) (132/45 - 159/69)  BP(mean): --  RR: 18 (04-06-25 @ 08:40) (18 - 18)  SpO2: 98% (04-06-25 @ 08:40) (95% - 100%)    Physical Exam:  Constitutional:  well preserved,  Head/Eyes: no icterus,  ENT:  supple; no thrush  LUNGS:  Diminished breath sounds left side  CVS:  normal S1, S2, no murmur  Abd:  soft, non-tender; non-distended  Ext:  no edema  Vascular:  IV site no erythema tenderness or discharge  MSK:  joints without swelling  Neuro: AAO X 3, non- focal      Lab Data:                        10.6   5.91  )-----------( 165      ( 06 Apr 2025 05:44 )             33.1     04-06    140  |  108[H]  |  25[H]  ----------------------------<  87  4.8   |  19[L]  |  1.36[H]    Ca    8.9      06 Apr 2025 05:44                      RADIOLOGY:  below reviewed    CT chest on 03/14/2025: - Trace ground-glass densities of right upper lobe.- Mild ground-glass densities of left upper lobe.- Mild relative volume loss of left lower lobe.- Multifocal pleural loculated fluid as follows:- Left mid hemithorax, major fissure, 4.0 x 2.0 cm- Left lower hemithorax, posterolaterally, 9.0 x 4.0 cmMultifocal pleural irregular thickening as follows:- Left mid hemithorax, medially, moderate- Left lower hemithorax, medially, mild to moderate- Left lower hemithorax, posteriorly, mild- Left lower hemithorax, laterally, mild      PET/CT on 03/25/2025: - Loculated small left pleural effusion with patchy pleural thickenings (SUV 4.5), medial left lower lobe opacity/mass 3.2 x 1.8 cm, avid SUV 12.7. These are slightly smaller since the chest CT. The findings are suspicious for pneumonia with parapneumonic effusion, versus medial left lower lobe lung malignancy with pleuralinvolvement.- There is no FDG avid hilar or mediastinal lymphadenopathy.- Small groundglass densities in the left and right upper lobes are nonavid. There is no FDG avid lesion in the rightlung. 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      
Patient is a 89y old  Female who presents with a chief complaint of IV antibiotics (05 Apr 2025 07:27)    Being followed by ID for empyema     Interval history:  Reports no new complaints this morning  planned for repeat CT chest today   reports some left sided chest pain when coughing, otherwise no other complaints   TTE- pending    ROS:  No N/V/D  No abd pain  No urinary complaints  No HA  No joint or limb pain  No other complaints    Antimicrobials:  cefTRIAXone   IVPB 2000 milliGRAM(s) IV Intermittent every 24 hours  metroNIDAZOLE  IVPB 500 milliGRAM(s) IV Intermittent every 12 hours      Vital Signs Last 24 Hrs  T(C): 36.6 (04-05-25 @ 07:35), Max: 37 (04-04-25 @ 13:30)  T(F): 97.9 (04-05-25 @ 07:35), Max: 98.6 (04-04-25 @ 13:30)  HR: 70 (04-05-25 @ 07:35) (66 - 90)  BP: 145/69 (04-05-25 @ 07:35) (101/55 - 154/63)  BP(mean): --  RR: 18 (04-05-25 @ 07:35) (16 - 18)  SpO2: 97% (04-05-25 @ 07:35) (97% - 100%)    Physical Exam:  Constitutional:  well preserved,  Head/Eyes: no icterus,  ENT:  supple; no thrush  LUNGS:  Diminished breath sounds left side  CVS:  normal S1, S2, no murmur  Abd:  soft, non-tender; non-distended  Ext:  no edema  Vascular:  IV site no erythema tenderness or discharge  MSK:  joints without swelling  Neuro: AAO X 3, non- focal    Lab Data:                        10.9   6.07  )-----------( 165      ( 05 Apr 2025 05:34 )             33.2     04-05    139  |  107  |  29[H]  ----------------------------<  85  4.5   |  20[L]  |  1.23    Ca    9.3      05 Apr 2025 05:34                    RADIOLOGY:  below reviewed    CT chest on 03/14/2025: - Trace ground-glass densities of right upper lobe.- Mild ground-glass densities of left upper lobe.- Mild relative volume loss of left lower lobe.- Multifocal pleural loculated fluid as follows:- Left mid hemithorax, major fissure, 4.0 x 2.0 cm- Left lower hemithorax, posterolaterally, 9.0 x 4.0 cmMultifocal pleural irregular thickening as follows:- Left mid hemithorax, medially, moderate- Left lower hemithorax, medially, mild to moderate- Left lower hemithorax, posteriorly, mild- Left lower hemithorax, laterally, mild      PET/CT on 03/25/2025: - Loculated small left pleural effusion with patchy pleural thickenings (SUV 4.5), medial left lower lobe opacity/mass 3.2 x 1.8 cm, avid SUV 12.7. These are slightly smaller since the chest CT. The findings are suspicious for pneumonia with parapneumonic effusion, versus medial left lower lobe lung malignancy with pleuralinvolvement.- There is no FDG avid hilar or mediastinal lymphadenopathy.- Small groundglass densities in the left and right upper lobes are nonavid. There is no FDG avid lesion in the rightlung. 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    
THORACIC SURGERY PROGRESS NOTE    Patient: SHWETA HOLLIDAY , 89y ([birth year])Female   MRN: [id number]  Location: [address]  Visit: 04-04-25 Inpatient  Date: 04-05-25 @ 06:54      Subjective: No acute events overnight. Patient resting comfortably in bed, no complaints.     PAST MEDICAL & SURGICAL HISTORY:  Hypertension      High cholesterol      Ulcerative colitis      Anxiety      Skin lesion of right arm      Pleural effusion      History of Mohs micrographic surgery for skin cancer          Vitals: T(F): 97.7 (04-05-25 @ 04:24), Max: 98.6 (04-04-25 @ 13:30)  HR: 72 (04-05-25 @ 04:24)  BP: 154/52 (04-05-25 @ 04:24)  RR: 16 (04-05-25 @ 04:24)  SpO2: 98% (04-05-25 @ 04:24)      Diet, Regular:   DASH/TLC Sodium & Cholesterol Restricted (DASH)          PHYSICAL EXAM  GEN: No acute distress   CV: RRR, no JVD  LUNGS: Respirations unlabored, no use of accessory muscles  ABD: Abdomen soft, ND, NT   EXT: No peripheral edema. Regular range of motion. Bruising on arms from lab draws.     MEDICATIONS  (STANDING):  cefTRIAXone   IVPB 2000 milliGRAM(s) IV Intermittent every 24 hours  heparin   Injectable 5000 Unit(s) SubCutaneous every 12 hours  metroNIDAZOLE  IVPB 500 milliGRAM(s) IV Intermittent every 12 hours  simvastatin 40 milliGRAM(s) Oral at bedtime  valsartan 40 milliGRAM(s) Oral daily    MEDICATIONS  (PRN):  acetaminophen     Tablet .. 650 milliGRAM(s) Oral every 6 hours PRN Temp greater or equal to 38C (100.4F), Mild Pain (1 - 3)      DVT PROPHYLAXIS: SCDs, heparin   Injectable 5000 Unit(s) SubCutaneous every 12 hours    GI PROPHYLAXIS:   ANTICOAGULATION:   ANTIBIOTICS: cefTRIAXone   IVPB 2000 milliGRAM(s)  metroNIDAZOLE  IVPB 500 milliGRAM(s)        LAB/STUDIES:  CAPILLARY BLOOD GLUCOSE                              11.2   5.54  )-----------( 197      ( 04 Apr 2025 14:57 )             34.0     04-04    138  |  105  |  31[H]  ----------------------------<  79  4.2   |  20[L]  |  1.39[H]    Ca    9.7      04 Apr 2025 14:57          Urinalysis Basic - ( 04 Apr 2025 14:57 )    Color: x / Appearance: x / SG: x / pH: x  Gluc: 79 mg/dL / Ketone: x  / Bili: x / Urobili: x   Blood: x / Protein: x / Nitrite: x   Leuk Esterase: x / RBC: x / WBC x   Sq Epi: x / Non Sq Epi: x / Bacteria: x                
CARDIOLOGY FOLLOW UP - Dr. Tse  DATE OF SERVICE: 4/6/25    CC  No cv complaints     REVIEW OF SYSTEMS:  CONSTITUTIONAL: No fever, weight loss, or fatigue  RESPIRATORY: No cough, wheezing, chills or hemoptysis; No Shortness of Breath  CARDIOVASCULAR: No chest pain, palpitations, passing out, dizziness, or leg swelling  GASTROINTESTINAL: No abdominal or epigastric pain. No nausea, vomiting, or hematemesis; No diarrhea or constipation. No melena or hematochezia.  VASCULAR: No edema     PHYSICAL EXAM:  T(C): 36.9 (04-06-25 @ 05:05), Max: 36.9 (04-06-25 @ 05:05)  HR: 70 (04-06-25 @ 05:05) (64 - 77)  BP: 151/58 (04-06-25 @ 05:05) (132/45 - 159/69)  RR: 18 (04-06-25 @ 05:05) (18 - 18)  SpO2: 95% (04-06-25 @ 05:05) (95% - 100%)  Wt(kg): --  I&O's Summary    05 Apr 2025 07:01  -  06 Apr 2025 07:00  --------------------------------------------------------  IN: 775 mL / OUT: 950 mL / NET: -175 mL        Appearance: Elderly female 	  Cardiovascular: Normal S1 S2,RRR, No JVD, No murmurs  Respiratory: Diminished L lung base   Gastrointestinal:  Soft, Non-tender, + BS	  Extremities: Normal range of motion, No clubbing, cyanosis or edema      Home Medications:  simvastatin 40 mg oral tablet: 1 tab(s) orally once a day (04 Apr 2025 17:00)  valsartan 40 mg oral tablet: 1 tab(s) orally once a day (04 Apr 2025 17:00)      MEDICATIONS  (STANDING):  cefTRIAXone   IVPB 2000 milliGRAM(s) IV Intermittent every 24 hours  heparin   Injectable 5000 Unit(s) SubCutaneous every 12 hours  metroNIDAZOLE  IVPB 500 milliGRAM(s) IV Intermittent every 12 hours  simvastatin 40 milliGRAM(s) Oral at bedtime  valsartan 40 milliGRAM(s) Oral daily      TELEMETRY: SR    ECG:  	  RADIOLOGY:   < from: CT Chest No Cont (04.05.25 @ 09:43) >  IMPRESSION:  Left-sided pleural thickening most prominent adjacent to the descending   aorta correspond with findings present on prior PET/CT scan.    Small loculated left pleural effusion, not significantly changed compared   to prior exam.        --- End of Report ---    < end of copied text >    DIAGNOSTIC TESTING:  [ ] Echocardiogram:  [ ]  Catheterization:  [ ] Stress Test:    OTHER: 	    LABS:	 	                            10.6   5.91  )-----------( 165      ( 06 Apr 2025 05:44 )             33.1     04-06    140  |  108[H]  |  25[H]  ----------------------------<  87  4.8   |  19[L]  |  1.36[H]    Ca    8.9      06 Apr 2025 05:44      PT/INR - ( 05 Apr 2025 05:34 )   PT: 11.1 sec;   INR: 0.96 ratio         PTT - ( 05 Apr 2025 05:34 )  PTT:33.2 sec

## 2025-04-06 NOTE — DISCHARGE NOTE PROVIDER - HOSPITAL COURSE
Patient is a 89 y/o F former smoker with PMHx significant for HTN, HLD, , UC on Mesalamine stopped a week ago, TIA, Skin   cancer s/p Mohs surgery x2 who is being admitted tot he thoracic surgery service for IV antibiotics. L Pleural effusion found on CT chest 3/14/2025, drained by IR 4/3/2025. Pleural fluid +PMNs, +GNR.   4/5-ID consulted. On IV antibiotics. Repeat CT scan ordered. 4/6-CT scan with small effusion. Culture results changed to no growth and final, ID made aware.   Cleared for dc to home by Dr. No and ID off of any antibiotics with f/u as outpatient. Pt in agreement to plan.

## 2025-04-06 NOTE — DISCHARGE NOTE PROVIDER - NSDCFUADDAPPT_GEN_ALL_CORE_FT
Dr. No's office will contact you about setting up a Tele Health visit for Monday April 14th. Any questions call [phone]  See infectious disease, Dr. Gardiner as needed

## 2025-04-06 NOTE — DISCHARGE NOTE PROVIDER - CARE PROVIDER_API CALL
Dallas No  Thoracic Surgery  [address] [place] [address] [place] [address]  Phone: [phone]  Fax: [phone]  Follow Up Time:     Aneesh Gardiner  Infectious Disease  [address] [place] [id number]  Phone: [phone]  Fax: [phone]  Follow Up Time:

## 2025-04-07 ENCOUNTER — NON-APPOINTMENT (OUTPATIENT)
Age: 89
End: 2025-04-07

## 2025-04-07 LAB — NON-GYNECOLOGICAL CYTOLOGY STUDY: SIGNIFICANT CHANGE UP

## 2025-04-08 ENCOUNTER — NON-APPOINTMENT (OUTPATIENT)
Age: 89
End: 2025-04-08

## 2025-04-09 LAB
GAMMA INTERFERON BACKGROUND BLD IA-ACNC: 0.02 IU/ML — SIGNIFICANT CHANGE UP
M TB IFN-G BLD-IMP: ABNORMAL
M TB IFN-G CD4+ BCKGRND COR BLD-ACNC: 0 IU/ML — SIGNIFICANT CHANGE UP
M TB IFN-G CD4+CD8+ BCKGRND COR BLD-ACNC: 0 IU/ML — SIGNIFICANT CHANGE UP
QUANT TB PLUS MITOGEN MINUS NIL: 0.07 IU/ML — SIGNIFICANT CHANGE UP

## 2025-04-14 ENCOUNTER — APPOINTMENT (OUTPATIENT)
Dept: THORACIC SURGERY | Facility: CLINIC | Age: 89
End: 2025-04-14

## 2025-04-14 DIAGNOSIS — J90 PLEURAL EFFUSION, NOT ELSEWHERE CLASSIFIED: ICD-10-CM

## 2025-04-17 ENCOUNTER — TRANSCRIPTION ENCOUNTER (OUTPATIENT)
Age: 89
End: 2025-04-17

## 2025-06-16 ENCOUNTER — APPOINTMENT (OUTPATIENT)
Dept: THORACIC SURGERY | Facility: CLINIC | Age: 89
End: 2025-06-16
Payer: MEDICARE

## 2025-06-16 ENCOUNTER — OUTPATIENT (OUTPATIENT)
Dept: OUTPATIENT SERVICES | Facility: HOSPITAL | Age: 89
LOS: 1 days | End: 2025-06-16
Payer: MEDICARE

## 2025-06-16 ENCOUNTER — APPOINTMENT (OUTPATIENT)
Dept: RADIOLOGY | Facility: HOSPITAL | Age: 89
End: 2025-06-16

## 2025-06-16 VITALS
BODY MASS INDEX: 26.21 KG/M2 | DIASTOLIC BLOOD PRESSURE: 82 MMHG | HEIGHT: 59 IN | SYSTOLIC BLOOD PRESSURE: 159 MMHG | RESPIRATION RATE: 16 BRPM | HEART RATE: 68 BPM | WEIGHT: 130 LBS | OXYGEN SATURATION: 97 %

## 2025-06-16 DIAGNOSIS — Z85.828 PERSONAL HISTORY OF OTHER MALIGNANT NEOPLASM OF SKIN: Chronic | ICD-10-CM

## 2025-06-16 DIAGNOSIS — J90 PLEURAL EFFUSION, NOT ELSEWHERE CLASSIFIED: ICD-10-CM

## 2025-06-16 PROCEDURE — 71046 X-RAY EXAM CHEST 2 VIEWS: CPT | Mod: 26

## 2025-06-16 PROCEDURE — 99214 OFFICE O/P EST MOD 30 MIN: CPT

## 2025-06-23 NOTE — ED ADULT NURSE NOTE - CADM POA PRESS ULCER
Mother     Heart Disease Father     Coronary Art Dis Father     Heart Attack Father     Heart Attack Brother     Other Sister         AAA       Review of Systems   Constitutional:  Negative for chills, fatigue and fever.   HENT:  Negative for congestion, ear pain, nosebleeds, postnasal drip and sore throat.    Respiratory:  Negative for cough, chest tightness and wheezing.    Cardiovascular:  Negative for chest pain, palpitations and leg swelling.   Gastrointestinal:  Negative for abdominal pain and constipation.   Genitourinary:  Negative for dysuria and urgency.   Musculoskeletal: Negative.  Negative for arthralgias.   Skin:  Negative for rash.   Neurological:  Negative for dizziness and headaches.   Psychiatric/Behavioral: Negative.       Vitals:    06/23/25 0840   BP: 124/76   Pulse: 70   Temp: 97.6 °F (36.4 °C)   SpO2: 97%   Weight: 83.9 kg (185 lb)     Body mass index is 29.41 kg/m².    Physical Exam  Constitutional:       Appearance: She is well-developed.   HENT:      Mouth/Throat:      Pharynx: No oropharyngeal exudate.   Eyes:      Conjunctiva/sclera: Conjunctivae normal.      Pupils: Pupils are equal, round, and reactive to light.   Neck:      Thyroid: No thyromegaly.      Vascular: No JVD.   Cardiovascular:      Rate and Rhythm: Normal rate.      Heart sounds: Normal heart sounds. No murmur heard.  Pulmonary:      Effort: No respiratory distress.      Breath sounds: Rhonchi and rales present. No wheezing.   Chest:      Chest wall: No tenderness.   Abdominal:      General: Bowel sounds are normal.      Palpations: Abdomen is soft.   Musculoskeletal:      Cervical back: Neck supple.   Lymphadenopathy:      Cervical: No cervical adenopathy.   Skin:     Findings: No rash.   Neurological:      Mental Status: She is oriented to person, place, and time.         Lab Review   No visits with results within 2 Month(s) from this visit.   Latest known visit with results is:   Orders Only on 09/25/2024   Component  No

## 2025-07-08 ENCOUNTER — APPOINTMENT (OUTPATIENT)
Dept: INTERVENTIONAL RADIOLOGY/VASCULAR | Facility: CLINIC | Age: 89
End: 2025-07-08

## 2025-07-08 ENCOUNTER — NON-APPOINTMENT (OUTPATIENT)
Age: 89
End: 2025-07-08

## 2025-07-08 VITALS
HEART RATE: 70 BPM | HEIGHT: 59 IN | WEIGHT: 124 LBS | SYSTOLIC BLOOD PRESSURE: 144 MMHG | OXYGEN SATURATION: 97 % | RESPIRATION RATE: 17 BRPM | DIASTOLIC BLOOD PRESSURE: 79 MMHG | BODY MASS INDEX: 25 KG/M2

## 2025-07-08 PROCEDURE — 99214 OFFICE O/P EST MOD 30 MIN: CPT

## 2025-07-11 ENCOUNTER — OUTPATIENT (OUTPATIENT)
Dept: OUTPATIENT SERVICES | Facility: HOSPITAL | Age: 89
LOS: 1 days | End: 2025-07-11

## 2025-07-11 VITALS
TEMPERATURE: 98 F | RESPIRATION RATE: 16 BRPM | DIASTOLIC BLOOD PRESSURE: 79 MMHG | HEART RATE: 64 BPM | OXYGEN SATURATION: 100 % | WEIGHT: 128.09 LBS | HEIGHT: 59 IN | SYSTOLIC BLOOD PRESSURE: 146 MMHG

## 2025-07-11 DIAGNOSIS — R29.1 MENINGISMUS: ICD-10-CM

## 2025-07-11 DIAGNOSIS — I10 ESSENTIAL (PRIMARY) HYPERTENSION: ICD-10-CM

## 2025-07-11 DIAGNOSIS — Z85.828 PERSONAL HISTORY OF OTHER MALIGNANT NEOPLASM OF SKIN: Chronic | ICD-10-CM

## 2025-07-11 DIAGNOSIS — R91.8 OTHER NONSPECIFIC ABNORMAL FINDING OF LUNG FIELD: ICD-10-CM

## 2025-07-11 LAB
ALBUMIN SERPL ELPH-MCNC: 4.2 G/DL — SIGNIFICANT CHANGE UP (ref 3.3–5)
ALP SERPL-CCNC: 63 U/L — SIGNIFICANT CHANGE UP (ref 40–120)
ALT FLD-CCNC: 10 U/L — SIGNIFICANT CHANGE UP (ref 4–33)
ANION GAP SERPL CALC-SCNC: 14 MMOL/L — SIGNIFICANT CHANGE UP (ref 7–14)
AST SERPL-CCNC: 16 U/L — SIGNIFICANT CHANGE UP (ref 4–32)
BILIRUB SERPL-MCNC: 0.2 MG/DL — SIGNIFICANT CHANGE UP (ref 0.2–1.2)
BUN SERPL-MCNC: 26 MG/DL — HIGH (ref 7–23)
CALCIUM SERPL-MCNC: 9.7 MG/DL — SIGNIFICANT CHANGE UP (ref 8.4–10.5)
CHLORIDE SERPL-SCNC: 104 MMOL/L — SIGNIFICANT CHANGE UP (ref 98–107)
CO2 SERPL-SCNC: 21 MMOL/L — LOW (ref 22–31)
CREAT SERPL-MCNC: 1.46 MG/DL — HIGH (ref 0.5–1.3)
EGFR: 34 ML/MIN/1.73M2 — LOW
EGFR: 34 ML/MIN/1.73M2 — LOW
GLUCOSE SERPL-MCNC: 74 MG/DL — SIGNIFICANT CHANGE UP (ref 70–99)
HCT VFR BLD CALC: 40.7 % — SIGNIFICANT CHANGE UP (ref 34.5–45)
HGB BLD-MCNC: 13.1 G/DL — SIGNIFICANT CHANGE UP (ref 11.5–15.5)
MCHC RBC-ENTMCNC: 31.1 PG — SIGNIFICANT CHANGE UP (ref 27–34)
MCHC RBC-ENTMCNC: 32.2 G/DL — SIGNIFICANT CHANGE UP (ref 32–36)
MCV RBC AUTO: 96.7 FL — SIGNIFICANT CHANGE UP (ref 80–100)
NRBC # BLD AUTO: 0 K/UL — SIGNIFICANT CHANGE UP (ref 0–0)
NRBC # FLD: 0 K/UL — SIGNIFICANT CHANGE UP (ref 0–0)
NRBC BLD AUTO-RTO: 0 /100 WBCS — SIGNIFICANT CHANGE UP (ref 0–0)
PLATELET # BLD AUTO: 202 K/UL — SIGNIFICANT CHANGE UP (ref 150–400)
PMV BLD: 12.1 FL — SIGNIFICANT CHANGE UP (ref 7–13)
POTASSIUM SERPL-MCNC: 4.6 MMOL/L — SIGNIFICANT CHANGE UP (ref 3.5–5.3)
POTASSIUM SERPL-SCNC: 4.6 MMOL/L — SIGNIFICANT CHANGE UP (ref 3.5–5.3)
PROT SERPL-MCNC: 7.3 G/DL — SIGNIFICANT CHANGE UP (ref 6–8.3)
RBC # BLD: 4.21 M/UL — SIGNIFICANT CHANGE UP (ref 3.8–5.2)
RBC # FLD: 13.7 % — SIGNIFICANT CHANGE UP (ref 10.3–14.5)
SODIUM SERPL-SCNC: 139 MMOL/L — SIGNIFICANT CHANGE UP (ref 135–145)
WBC # BLD: 6.04 K/UL — SIGNIFICANT CHANGE UP (ref 3.8–10.5)
WBC # FLD AUTO: 6.04 K/UL — SIGNIFICANT CHANGE UP (ref 3.8–10.5)

## 2025-07-11 NOTE — H&P PST ADULT - NSANTHOSAYNRD_GEN_A_CORE
No. HERMES screening performed.  STOP BANG Legend: 0-2 = LOW Risk; 3-4 = INTERMEDIATE Risk; 5-8 = HIGH Risk

## 2025-07-11 NOTE — H&P PST ADULT - COMMENTS
Pt denies loose teeth - full upper denture   Mallampati Pt denies loose teeth - full upper denture   Mallampati 3

## 2025-07-11 NOTE — H&P PST ADULT - NSICDXPASTMEDICALHX_GEN_ALL_CORE_FT
PAST MEDICAL HISTORY:  Anxiety     High cholesterol     Hypertension     Lung mass     Pleural effusion     Skin lesion of right arm     Ulcerative colitis      PAST MEDICAL HISTORY:  Anxiety     High cholesterol     Hypertension     Lung mass     Pleural effusion     Skin lesion of right arm     Smoking history     Ulcerative colitis

## 2025-07-11 NOTE — H&P PST ADULT - PROBLEM SELECTOR PLAN 1
Pt scheduled for surgery and preop instructions including instructions  for the day of surgery, given verbally and with use of  written materials, and patient confirming understanding of such instructions using  teach back method.

## 2025-07-11 NOTE — H&P PST ADULT - FUNCTIONAL STATUS
METs DASI 5.07 - pt with advanced age - hx pleural effusion 4/25 - continues to c/o SOB with prolonged activity/4-10 METS METs DASI 5.07 - pt with advanced age - hx pleural effusion and lung mass - continues to be independent and active/4-10 METS

## 2025-07-11 NOTE — H&P PST ADULT - MUSCULOSKELETAL COMMENTS
Pt with episodic upper back pain that is increasing in frequency Pt c/o of chronic upper back pain that is increasing in severity

## 2025-07-11 NOTE — H&P PST ADULT - NSICDXFAMHXNEG_GEN_ALL
Normal and symmetric appearance without webbing, redundant skin, masses, pits or sternocleidomastoid muscle lesions; clavicles of normal shape, contour and nontender on palpation. n/a

## 2025-07-11 NOTE — H&P PST ADULT - SKIN
Head, normocephalic, atraumatic, Face, Face within normal limits, Ears, External ears within normal limits warm and dry

## 2025-07-11 NOTE — H&P PST ADULT - HISTORY OF PRESENT ILLNESS
Patient is a 89 y/o F former smoker with PMHx significant for HTN, HLD, , UC on Mesalamine stopped a week ago, TIA, Skin   cancer s/p Mohs surgery x2 who is being admitted tot he thoracic surgery service for IV antibiotics. Patient had an incidental finding of pleural effusion on CT A/P 3/7/2025 done during a w/u for abdominal pain. Patient had follow-up formal imaging shown below:    CT chest on 03/14/2025:   - Trace ground-glass densities of right upper lobe.  - Mild ground-glass densities of left upper lobe.  - Mild relative volume loss of left lower lobe.  - Multifocal pleural loculated fluid as follows:  - Left mid hemithorax, major fissure, 4.0 x 2.0 cm  - Left lower hemithorax, posterolaterally, 9.0 x 4.0 cm  Multifocal pleural irregular thickening as follows:  - Left mid hemithorax, medially, moderate  - Left lower hemithorax, medially, mild to moderate  - Left lower hemithorax, posteriorly, mild  - Left lower hemithorax, laterally, mild    PET/CT on 03/25/2025:   - Loculated small left pleural effusion with patchy pleural thickenings (SUV 4.5), medial left lower lobe opacity/mass 3.2 x 1.8 cm, avid   SUV 12.7. These are slightly smaller since the chest CT. The findings are suspicious for pneumonia with parapneumonic effusion,   versus medial left lower lobe lung malignancy with pleural involvement.  - There is no FDG avid hilar or mediastinal lymphadenopathy.  - Small groundglass densities in the left and right upper lobes are nonavid. There is no FDG avid lesion in the right lung. There is no   pleural effusion on the right.  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    Patient evaluated by Dr. No in office 3/31/2025 and she was sent for IR guided drainage of pleural fluid. Sent sent to IR Dr. Louise for a diagnostic US guided left thoracentesis on 4/3/2025. Pleural fluid positive for PMNs and Gram negative rods.    On exam today, patient appears well. Patient has no complaints at this time. Per pt daughter who is at the bedside, patient has "not been herself" since early March. Patient states prior to drainage of fluid, she would occasionally have pain with deep breathing. Denies fevers, chills, body aces, CP, SOB, N/V/D.         Pt is a  89 year old female scheduled for Lung biopsy with Dr Cardenas 7/22/25 Pt is a former smoker, w/ hx of UC on Mesalamine (Stopped  several weeks ago), HTN, HLD, TIA, Skin Ca (Unknown types) s/p Mohs surgery x2, who found to have pleural effusion incidentally on CT A/P on 03/07/2025 during work up for abdominal pain. Thoracentesis done 4/3/25 done with cytology noting atypical findings - CT chest 6/4/25 noted soft tissue density in LLL with right lung groundglass nodule RUL - decrease in size of left pleural effusion - Pt held off on biopsy until after planned travel and now for procedure

## 2025-07-21 PROBLEM — R91.8 OTHER NONSPECIFIC ABNORMAL FINDING OF LUNG FIELD: Chronic | Status: ACTIVE | Noted: 2025-07-11

## 2025-07-21 PROBLEM — Z87.891 PERSONAL HISTORY OF NICOTINE DEPENDENCE: Chronic | Status: ACTIVE | Noted: 2025-07-11

## 2025-07-22 ENCOUNTER — OUTPATIENT (OUTPATIENT)
Dept: OUTPATIENT SERVICES | Facility: HOSPITAL | Age: 89
LOS: 1 days | End: 2025-07-22
Payer: MEDICARE

## 2025-07-22 ENCOUNTER — TRANSCRIPTION ENCOUNTER (OUTPATIENT)
Age: 89
End: 2025-07-22

## 2025-07-22 ENCOUNTER — RESULT REVIEW (OUTPATIENT)
Age: 89
End: 2025-07-22

## 2025-07-22 VITALS
WEIGHT: 123.9 LBS | SYSTOLIC BLOOD PRESSURE: 190 MMHG | TEMPERATURE: 96 F | HEART RATE: 71 BPM | OXYGEN SATURATION: 100 % | DIASTOLIC BLOOD PRESSURE: 79 MMHG | HEIGHT: 59 IN | RESPIRATION RATE: 18 BRPM

## 2025-07-22 DIAGNOSIS — Z85.828 PERSONAL HISTORY OF OTHER MALIGNANT NEOPLASM OF SKIN: Chronic | ICD-10-CM

## 2025-07-22 DIAGNOSIS — R91.8 OTHER NONSPECIFIC ABNORMAL FINDING OF LUNG FIELD: ICD-10-CM

## 2025-07-22 PROCEDURE — 71250 CT THORAX DX C-: CPT | Mod: 26

## 2025-07-24 DIAGNOSIS — J90 PLEURAL EFFUSION, NOT ELSEWHERE CLASSIFIED: ICD-10-CM

## 2025-07-28 ENCOUNTER — NON-APPOINTMENT (OUTPATIENT)
Age: 89
End: 2025-07-28

## 2025-08-05 ENCOUNTER — RESULT REVIEW (OUTPATIENT)
Age: 89
End: 2025-08-05

## 2025-08-05 ENCOUNTER — TRANSCRIPTION ENCOUNTER (OUTPATIENT)
Age: 89
End: 2025-08-05

## 2025-08-05 ENCOUNTER — OUTPATIENT (OUTPATIENT)
Dept: OUTPATIENT SERVICES | Facility: HOSPITAL | Age: 89
LOS: 1 days | End: 2025-08-05
Payer: MEDICARE

## 2025-08-05 VITALS
HEART RATE: 73 BPM | DIASTOLIC BLOOD PRESSURE: 72 MMHG | OXYGEN SATURATION: 99 % | SYSTOLIC BLOOD PRESSURE: 155 MMHG | RESPIRATION RATE: 16 BRPM

## 2025-08-05 VITALS
SYSTOLIC BLOOD PRESSURE: 148 MMHG | DIASTOLIC BLOOD PRESSURE: 63 MMHG | RESPIRATION RATE: 18 BRPM | TEMPERATURE: 96 F | OXYGEN SATURATION: 100 % | WEIGHT: 123.9 LBS | HEIGHT: 59 IN | HEART RATE: 73 BPM

## 2025-08-05 DIAGNOSIS — R91.8 OTHER NONSPECIFIC ABNORMAL FINDING OF LUNG FIELD: ICD-10-CM

## 2025-08-05 DIAGNOSIS — Z85.828 PERSONAL HISTORY OF OTHER MALIGNANT NEOPLASM OF SKIN: Chronic | ICD-10-CM

## 2025-08-05 PROCEDURE — 88172 CYTP DX EVAL FNA 1ST EA SITE: CPT | Mod: 26

## 2025-08-05 PROCEDURE — 88173 CYTOPATH EVAL FNA REPORT: CPT | Mod: 26

## 2025-08-05 PROCEDURE — 88342 IMHCHEM/IMCYTCHM 1ST ANTB: CPT | Mod: 26

## 2025-08-05 PROCEDURE — 71045 X-RAY EXAM CHEST 1 VIEW: CPT | Mod: 26

## 2025-08-05 PROCEDURE — 77012 CT SCAN FOR NEEDLE BIOPSY: CPT | Mod: 26

## 2025-08-05 PROCEDURE — 88305 TISSUE EXAM BY PATHOLOGIST: CPT | Mod: 26

## 2025-08-05 PROCEDURE — 32400 NEEDLE BIOPSY CHEST LINING: CPT | Mod: LT

## 2025-08-05 RX ORDER — DIAZEPAM 5 MG/1
1 TABLET ORAL
Refills: 0 | DISCHARGE

## 2025-08-08 DIAGNOSIS — R91.8 OTHER NONSPECIFIC ABNORMAL FINDING OF LUNG FIELD: ICD-10-CM

## 2025-08-08 DIAGNOSIS — J94.9 PLEURAL CONDITION, UNSPECIFIED: ICD-10-CM

## 2025-08-08 DIAGNOSIS — J92.9 PLEURAL PLAQUE WITHOUT ASBESTOS: ICD-10-CM

## 2025-08-12 LAB — NON-GYNECOLOGICAL CYTOLOGY STUDY: SIGNIFICANT CHANGE UP

## 2025-08-14 ENCOUNTER — APPOINTMENT (OUTPATIENT)
Dept: RADIOLOGY | Facility: HOSPITAL | Age: 89
End: 2025-08-14

## 2025-08-14 ENCOUNTER — OUTPATIENT (OUTPATIENT)
Dept: OUTPATIENT SERVICES | Facility: HOSPITAL | Age: 89
LOS: 1 days | End: 2025-08-14
Payer: MEDICARE

## 2025-08-14 ENCOUNTER — APPOINTMENT (OUTPATIENT)
Dept: THORACIC SURGERY | Facility: CLINIC | Age: 89
End: 2025-08-14
Payer: MEDICARE

## 2025-08-14 VITALS
OXYGEN SATURATION: 97 % | DIASTOLIC BLOOD PRESSURE: 79 MMHG | HEART RATE: 75 BPM | HEIGHT: 59 IN | SYSTOLIC BLOOD PRESSURE: 157 MMHG | WEIGHT: 120 LBS | BODY MASS INDEX: 24.19 KG/M2

## 2025-08-14 DIAGNOSIS — C34.92 MALIGNANT NEOPLASM OF UNSPECIFIED PART OF LEFT BRONCHUS OR LUNG: ICD-10-CM

## 2025-08-14 DIAGNOSIS — J90 PLEURAL EFFUSION, NOT ELSEWHERE CLASSIFIED: ICD-10-CM

## 2025-08-14 DIAGNOSIS — Z85.828 PERSONAL HISTORY OF OTHER MALIGNANT NEOPLASM OF SKIN: Chronic | ICD-10-CM

## 2025-08-14 PROCEDURE — 99214 OFFICE O/P EST MOD 30 MIN: CPT

## 2025-08-14 PROCEDURE — 71046 X-RAY EXAM CHEST 2 VIEWS: CPT | Mod: 26

## 2025-08-18 LAB — POINT SOLID NEXT-GENERATION SEQUENCING PANEL FINAL REPORT: SIGNIFICANT CHANGE UP
